# Patient Record
Sex: MALE | Race: WHITE | Employment: FULL TIME | URBAN - METROPOLITAN AREA
[De-identification: names, ages, dates, MRNs, and addresses within clinical notes are randomized per-mention and may not be internally consistent; named-entity substitution may affect disease eponyms.]

---

## 2017-01-18 ENCOUNTER — HOSPITAL ENCOUNTER (EMERGENCY)
Age: 52
Discharge: HOME | End: 2017-01-18
Attending: EMERGENCY MEDICINE
Payer: SELF-PAY

## 2017-01-18 VITALS
DIASTOLIC BLOOD PRESSURE: 97 MMHG | WEIGHT: 202.16 LBS | TEMPERATURE: 97.8 F | HEART RATE: 98 BPM | OXYGEN SATURATION: 97 % | BODY MASS INDEX: 26.79 KG/M2 | HEIGHT: 73 IN | SYSTOLIC BLOOD PRESSURE: 135 MMHG | RESPIRATION RATE: 18 BRPM

## 2017-01-18 DIAGNOSIS — Z72.0 TOBACCO ABUSE: ICD-10-CM

## 2017-01-18 DIAGNOSIS — S90.851A FOREIGN BODY IN FOOT, RIGHT, INITIAL ENCOUNTER: Primary | ICD-10-CM

## 2017-01-18 DIAGNOSIS — M25.474 SWELLING OF FOOT JOINT, RIGHT: ICD-10-CM

## 2017-01-18 PROCEDURE — 99282 EMERGENCY DEPT VISIT SF MDM: CPT

## 2017-01-18 RX ORDER — HYDROCODONE BITARTRATE AND ACETAMINOPHEN 5; 325 MG/1; MG/1
1 TABLET ORAL
Qty: 10 TAB | Refills: 0 | Status: SHIPPED | OUTPATIENT
Start: 2017-01-18 | End: 2017-01-21

## 2017-01-18 RX ORDER — CEPHALEXIN 500 MG/1
500 CAPSULE ORAL 4 TIMES DAILY
Qty: 28 CAP | Refills: 0 | Status: SHIPPED | OUTPATIENT
Start: 2017-01-18 | End: 2017-01-25

## 2017-01-18 NOTE — LETTER
[address] 
[facility] EMERGENCY DEPT 
[address] [place] [address] [address] [id number] 
[phone] Work/School Note Date: 1/18/2017 To Whom It May concern: 
 
Olayinka Thakkar was seen and treated today in the emergency room by the following provider(s): 
Attending Provider: Felicia Gonzalez MD 
Physician Assistant: LEANDER Nick. Please allow Olayinka Thakkar to be late to work today. He was in the ER this morning. Sincerely, Sandra Nick

## 2017-01-18 NOTE — ED NOTES
LEANDER Klein provided patient with verbal and written discharge instructions. Patient discharged ambulatory.

## 2017-01-18 NOTE — DISCHARGE INSTRUCTIONS
Object in the Skin: Care Instructions  Your Care Instructions  Small objects (splinters) of wood, metal, glass, or plastic can become embedded in the skin. Thorns from [employer] and other plants also can prick or become stuck in the skin. Splinters can cause an infection if they are not removed. Your doctor probably removed the object and cleaned the skin well. Your doctor may have given you antibiotics to prevent infection and a tetanus shot if you had not had one in the last 5 years or do not know when you had your last one. For a few days, you may have pain and itching in the wound where the object was removed. Follow-up care is a key part of your treatment and safety. Be sure to make and go to all appointments, and call your doctor if you are having problems. It's also a good idea to know your test results and keep a list of the medicines you take. How can you care for yourself at home? · If your doctor told you how to care for your wound, follow your doctor's instructions. If you did not get instructions, follow this general advice:  ¨ Wash the wound with clean water 2 times a day. Don't use hydrogen peroxide or alcohol, which can slow healing. ¨ You may cover the wound with a thin layer of petroleum jelly, such as Vaseline, and a nonstick bandage. ¨ Apply more petroleum jelly and replace the bandage as needed. · Your doctor may have used medicine to numb your skin. When it wears off, your pain may return. Take an over-the-counter pain medicine, such as acetaminophen (Tylenol), ibuprofen (Advil, Motrin), or naproxen (Aleve). Read and follow all instructions on the label. · Do not take two or more pain medicines at the same time unless the doctor told you to. Many pain medicines have acetaminophen, which is Tylenol. Too much acetaminophen (Tylenol) can be harmful. · If your doctor prescribed antibiotics, take them as directed. Do not stop taking them just because you feel better.  You need to take the full course of antibiotics. · After 2 or 3 days, if your swelling is gone, apply a heating pad set on low or a warm cloth to your wound area. Some doctors suggest that you go back and forth between hot and cold. Put a thin cloth between the heating pad and your skin. · It may help to prop up the affected part of your body on a pillow anytime you sit or lie down during the next 3 days. Try to keep it above the level of your heart. This will help reduce swelling. · Your wound may itch or feel irritated. A little redness and swelling is normal. Do not scratch or rub the wound. When should you call for help? Call your doctor now or seek immediate medical care if:  · The skin near the wound is cool or pale or changes color. · You have tingling, weakness, or numbness in the area near the wound. · The wound starts to bleed, and blood soaks the bandage. Oozing small amounts of blood is normal.  · You have trouble moving a limb near the wound. · You have signs of infection, such as:  ¨ Increased pain, swelling, warmth, or redness. ¨ Red streaks leading from the wound. ¨ Pus draining from the wound. ¨ A fever. Watch closely for changes in your health, and be sure to contact your doctor if:  · You do not get better as expected. Where can you learn more? Go to http://damián-dejuan.info/. Enter Santino Muñiz in the search box to learn more about \"Object in the Skin: Care Instructions. \"  Current as of: May 27, 2016  Content Version: 11.1  © 4173-8800 [employer]. Care instructions adapted under license by [employer] (which disclaims liability or warranty for this information). If you have questions about a medical condition or this instruction, always ask your healthcare professional. [address] any warranty or liability for your use of this information.

## 2017-01-18 NOTE — ED PROVIDER NOTES
The history is provided by the patient. No  was used. Betzy Abdi is a 49 y.o. male who presents ambulatory to [facility] ED c/o swelling and tenderness to ball right foot for few days with patient removing tiny shard of glass this morning. Weight bearing on ball of right foot for past few days \"felt like I was walking on tube of toothpaste\". Patient unsure when glass may have gotten into ball of foot. No drainage from site, calf pain, h/a, fever/chills, URI s/s, cough, n/v, SOB, CP, urinary s/s, or other specific c/o or concerns today. Tetanus UTD x 3 years ago. PCP: Christie Warren MD  Allergies: none    There are no other complaints, changes or physical findings at this time. Past Medical History:   Diagnosis Date    Hypertension        Past Surgical History:   Procedure Laterality Date    Hx orthopaedic       Left wrist    Hx orthopaedic       right knee    Hx orthopaedic       herniated disc    Hx appendectomy           No family history on file. Social History     Social History    Marital status: SINGLE     Spouse name: N/A    Number of children: N/A    Years of education: N/A     Occupational History    Not on file. Social History Main Topics    Smoking status: Current Every Day Smoker     Packs/day: 1.50    Smokeless tobacco: Not on file    Alcohol use 3.0 oz/week     6 Cans of beer per week    Drug use: No    Sexual activity: Not on file     Other Topics Concern    Not on file     Social History Narrative    No narrative on file         ALLERGIES: Review of patient's allergies indicates no known allergies. Review of Systems   Constitutional: Negative for chills and fever. HENT: Negative for ear pain, rhinorrhea and sore throat. Respiratory: Negative for cough and shortness of breath. Cardiovascular: Negative for chest pain. Gastrointestinal: Negative for nausea and vomiting. Genitourinary: Negative for dysuria.    Skin: Positive for wound. +Puncture wound ball right foot   Neurological: Negative for headaches. All other systems reviewed and are negative. There were no vitals filed for this visit. Physical Exam   Constitutional: He is oriented to person, place, and time. He appears well-developed and well-nourished. No distress. Pleasant 50 yo  male   HENT:   Head: Normocephalic and atraumatic. Eyes: Conjunctivae are normal. Right eye exhibits no discharge. Left eye exhibits no discharge. Neck: Normal range of motion. Neck supple. Cardiovascular: Normal rate and intact distal pulses. Pulmonary/Chest: Effort normal.   Musculoskeletal:   R FOOT: + tender puncture wound to the base of the 2nd metatarsal without drainage or surrounding erythema. Slight swelling. No ecchymosis. Distal NV intact. Cap refill < 2 sec. Ambulatory without limp. Neurological: He is alert and oriented to person, place, and time. Skin: Skin is warm and dry. He is not diaphoretic. Psychiatric: He has a normal mood and affect. His behavior is normal.   Nursing note and vitals reviewed. MDM  Number of Diagnoses or Management Options  Foreign body in foot, right, initial encounter:   Swelling of foot joint, right:   Tobacco abuse:   Diagnosis management comments: DDx: FB, abscess, cellulitis, puncture wound, tobacco abuse    Patient declines imaging today to evaluate for additional FB due to being \"self pay\". Pt has picture of small piece of glass that he pulled out of foot on his cell phone. ED Course       Procedures     TOBACCO COUNSELING:  Upon evaluation, pt expressed that they are a current tobacco user. Pt has been counseled on the dangers of smoking and was encouraged to quit as soon as possible in order to decrease further risks to their health. Pt has conveyed their understanding of the risks involved should they continue to use tobacco products.     7:20 AM  LEANDER Roman educated patient on the s/s or infection to watch for and advised patient to fill and use prescribed ABX as directed. DISCHARGE NOTE:  7:26 AM  The care plan has been outline with the patient and/or family, who verbally conveyed understanding and agreement. Available results have been reviewed. Patient and/or family understand the follow up plan as outlined and discharge instructions. Should their condition deterioration at any time after discharge the patient agrees to return, follow up sooner than outlined or seek medical assistance at the closest Emergency Room as soon as possible. Questions have been answered. Discharge instructions and educational information regarding the patient's diagnosis as well a list of reasons why the patient would want to seek immediate medical attention, should their condition change, were reviewed directly with the patient/family         CLINICAL IMPRESSION:  1. Foreign body in foot, right, initial encounter    2. Swelling of foot joint, right    3. Tobacco abuse        Plan:  1. Stop smoking  2. Narcotic precautions reviewed with patient prior to discharge  3. Fill ABX RX only if s/s of infection occur  4. Apply ice to area of discomfort  Current Discharge Medication List      START taking these medications    Details   cephALEXin (KEFLEX) 500 mg capsule Take 1 Cap by mouth four (4) times daily for 7 days. Qty: 28 Cap, Refills: 0      HYDROcodone-acetaminophen (NORCO) 5-325 mg per tablet Take 1 Tab by mouth every six (6) hours as needed for Pain for up to 3 days. Max Daily Amount: 4 Tabs. Qty: 10 Tab, Refills: 0         CONTINUE these medications which have NOT CHANGED    Details   lisinopril (PRINIVIL, ZESTRIL) 20 mg tablet Take 20 mg by mouth daily. ALPRAZOLAM (XANAX PO) Take  by mouth.            Follow-up Information     Follow up With Details Comments Contact Chidi Braun MD In 1 week As needed for wound check [address]  [place] [phone]  [phone]          Return to the closest emergency room or follow up sooner for any deterioration  Patient's condition is stable and patient is ready to go home. This note is prepared by Jacinta Litten, acting as Scribe for CHAUNCEY Casanova PA-C : The scribe's documentation has been prepared under my direction and personally reviewed by me in its entirety. I confirm that the note above accurately reflects all work, treatment, procedures, and medical decision making performed by me.

## 2019-03-08 ENCOUNTER — APPOINTMENT (OUTPATIENT)
Dept: ULTRASOUND IMAGING | Age: 54
End: 2019-03-08
Attending: EMERGENCY MEDICINE
Payer: COMMERCIAL

## 2019-03-08 ENCOUNTER — HOSPITAL ENCOUNTER (EMERGENCY)
Age: 54
Discharge: HOME | End: 2019-03-08
Attending: EMERGENCY MEDICINE
Payer: COMMERCIAL

## 2019-03-08 VITALS
OXYGEN SATURATION: 96 % | HEART RATE: 109 BPM | SYSTOLIC BLOOD PRESSURE: 134 MMHG | DIASTOLIC BLOOD PRESSURE: 73 MMHG | WEIGHT: 195.33 LBS | RESPIRATION RATE: 18 BRPM | BODY MASS INDEX: 25.89 KG/M2 | TEMPERATURE: 98.4 F | HEIGHT: 73 IN

## 2019-03-08 DIAGNOSIS — F41.1 ANXIETY STATE: ICD-10-CM

## 2019-03-08 DIAGNOSIS — R11.2 NON-INTRACTABLE VOMITING WITH NAUSEA, UNSPECIFIED VOMITING TYPE: Primary | ICD-10-CM

## 2019-03-08 DIAGNOSIS — R79.89 ELEVATED LFTS: ICD-10-CM

## 2019-03-08 LAB
ALBUMIN SERPL-MCNC: 3.1 G/DL (ref 3.5–5)
ALBUMIN/GLOB SERPL: 0.9 {RATIO} (ref 1.1–2.2)
ALP SERPL-CCNC: 262 U/L (ref 45–117)
ALT SERPL-CCNC: 226 U/L (ref 12–78)
AMPHET UR QL SCN: NEGATIVE
ANION GAP SERPL CALC-SCNC: 14 MMOL/L (ref 5–15)
APPEARANCE UR: CLEAR
AST SERPL-CCNC: 273 U/L (ref 15–37)
BACTERIA URNS QL MICRO: NEGATIVE /HPF
BARBITURATES UR QL SCN: NEGATIVE
BASOPHILS # BLD: 0 K/UL (ref 0–0.1)
BASOPHILS NFR BLD: 1 % (ref 0–1)
BENZODIAZ UR QL: NEGATIVE
BILIRUB SERPL-MCNC: 1.3 MG/DL (ref 0.2–1)
BILIRUB UR QL: NEGATIVE
BUN SERPL-MCNC: 13 MG/DL (ref 6–20)
BUN/CREAT SERPL: 11 (ref 12–20)
CALCIUM SERPL-MCNC: 8 MG/DL (ref 8.5–10.1)
CANNABINOIDS UR QL SCN: POSITIVE
CHLORIDE SERPL-SCNC: 88 MMOL/L (ref 97–108)
CK SERPL-CCNC: 206 U/L (ref 39–308)
CO2 SERPL-SCNC: 19 MMOL/L (ref 21–32)
COCAINE UR QL SCN: NEGATIVE
COLOR UR: NORMAL
COMMENT, HOLDF: NORMAL
CREAT SERPL-MCNC: 1.21 MG/DL (ref 0.7–1.3)
DIFFERENTIAL METHOD BLD: ABNORMAL
DRUG SCRN COMMENT,DRGCM: ABNORMAL
EOSINOPHIL # BLD: 0 K/UL (ref 0–0.4)
EOSINOPHIL NFR BLD: 0 % (ref 0–7)
EPITH CASTS URNS QL MICRO: NORMAL /LPF
ERYTHROCYTE [DISTWIDTH] IN BLOOD BY AUTOMATED COUNT: 11.9 % (ref 11.5–14.5)
GLOBULIN SER CALC-MCNC: 3.6 G/DL (ref 2–4)
GLUCOSE SERPL-MCNC: 165 MG/DL (ref 65–100)
GLUCOSE UR STRIP.AUTO-MCNC: NEGATIVE MG/DL
HCT VFR BLD AUTO: 40.3 % (ref 36.6–50.3)
HGB BLD-MCNC: 15.1 G/DL (ref 12.1–17)
HGB UR QL STRIP: NEGATIVE
HYALINE CASTS URNS QL MICRO: NORMAL /LPF (ref 0–5)
IMM GRANULOCYTES # BLD AUTO: 0 K/UL (ref 0–0.04)
IMM GRANULOCYTES NFR BLD AUTO: 0 % (ref 0–0.5)
KETONES UR QL STRIP.AUTO: NEGATIVE MG/DL
LEUKOCYTE ESTERASE UR QL STRIP.AUTO: NEGATIVE
LIPASE SERPL-CCNC: 146 U/L (ref 73–393)
LYMPHOCYTES # BLD: 1.7 K/UL (ref 0.8–3.5)
LYMPHOCYTES NFR BLD: 32 % (ref 12–49)
MCH RBC QN AUTO: 35.2 PG (ref 26–34)
MCHC RBC AUTO-ENTMCNC: 37.5 G/DL (ref 30–36.5)
MCV RBC AUTO: 93.9 FL (ref 80–99)
METHADONE UR QL: NEGATIVE
MONOCYTES # BLD: 0.3 K/UL (ref 0–1)
MONOCYTES NFR BLD: 6 % (ref 5–13)
NEUTS SEG # BLD: 3.3 K/UL (ref 1.8–8)
NEUTS SEG NFR BLD: 61 % (ref 32–75)
NITRITE UR QL STRIP.AUTO: NEGATIVE
NRBC # BLD: 0 K/UL (ref 0–0.01)
NRBC BLD-RTO: 0 PER 100 WBC
OPIATES UR QL: NEGATIVE
PCP UR QL: NEGATIVE
PH UR STRIP: 6 [PH] (ref 5–8)
PLATELET # BLD AUTO: 169 K/UL (ref 150–400)
PMV BLD AUTO: 10.8 FL (ref 8.9–12.9)
POTASSIUM SERPL-SCNC: 4.3 MMOL/L (ref 3.5–5.1)
PROT SERPL-MCNC: 6.7 G/DL (ref 6.4–8.2)
PROT UR STRIP-MCNC: NEGATIVE MG/DL
RBC # BLD AUTO: 4.29 M/UL (ref 4.1–5.7)
RBC #/AREA URNS HPF: NORMAL /HPF (ref 0–5)
SAMPLES BEING HELD,HOLD: NORMAL
SODIUM SERPL-SCNC: 121 MMOL/L (ref 136–145)
SP GR UR REFRACTOMETRY: 1.02 (ref 1–1.03)
TROPONIN I SERPL-MCNC: <0.05 NG/ML
TSH SERPL DL<=0.05 MIU/L-ACNC: 1.35 UIU/ML (ref 0.36–3.74)
UA: UC IF INDICATED,UAUC: NORMAL
UROBILINOGEN UR QL STRIP.AUTO: 1 EU/DL (ref 0.2–1)
WBC # BLD AUTO: 5.4 K/UL (ref 4.1–11.1)
WBC URNS QL MICRO: NORMAL /HPF (ref 0–4)

## 2019-03-08 PROCEDURE — 36415 COLL VENOUS BLD VENIPUNCTURE: CPT

## 2019-03-08 PROCEDURE — 82550 ASSAY OF CK (CPK): CPT

## 2019-03-08 PROCEDURE — 85025 COMPLETE CBC W/AUTO DIFF WBC: CPT

## 2019-03-08 PROCEDURE — 74011250636 HC RX REV CODE- 250/636: Performed by: EMERGENCY MEDICINE

## 2019-03-08 PROCEDURE — 96361 HYDRATE IV INFUSION ADD-ON: CPT

## 2019-03-08 PROCEDURE — 76705 ECHO EXAM OF ABDOMEN: CPT

## 2019-03-08 PROCEDURE — 99284 EMERGENCY DEPT VISIT MOD MDM: CPT

## 2019-03-08 PROCEDURE — 74011250637 HC RX REV CODE- 250/637: Performed by: EMERGENCY MEDICINE

## 2019-03-08 PROCEDURE — 84443 ASSAY THYROID STIM HORMONE: CPT

## 2019-03-08 PROCEDURE — 83690 ASSAY OF LIPASE: CPT

## 2019-03-08 PROCEDURE — 81001 URINALYSIS AUTO W/SCOPE: CPT

## 2019-03-08 PROCEDURE — 93005 ELECTROCARDIOGRAM TRACING: CPT

## 2019-03-08 PROCEDURE — 74011250636 HC RX REV CODE- 250/636: Performed by: PHYSICIAN ASSISTANT

## 2019-03-08 PROCEDURE — 96374 THER/PROPH/DIAG INJ IV PUSH: CPT

## 2019-03-08 PROCEDURE — 80053 COMPREHEN METABOLIC PANEL: CPT

## 2019-03-08 PROCEDURE — 84484 ASSAY OF TROPONIN QUANT: CPT

## 2019-03-08 PROCEDURE — 80307 DRUG TEST PRSMV CHEM ANLYZR: CPT

## 2019-03-08 PROCEDURE — 96376 TX/PRO/DX INJ SAME DRUG ADON: CPT

## 2019-03-08 PROCEDURE — 96375 TX/PRO/DX INJ NEW DRUG ADDON: CPT

## 2019-03-08 RX ORDER — ONDANSETRON 4 MG/1
4 TABLET, ORALLY DISINTEGRATING ORAL
Qty: 10 TAB | Refills: 0 | Status: SHIPPED | OUTPATIENT
Start: 2019-03-08 | End: 2019-04-10

## 2019-03-08 RX ORDER — TRAMADOL HYDROCHLORIDE 50 MG/1
50-100 TABLET ORAL 2 TIMES DAILY
COMMUNITY
End: 2019-04-10

## 2019-03-08 RX ORDER — NAPROXEN 500 MG/1
500 TABLET ORAL
COMMUNITY
End: 2019-04-10

## 2019-03-08 RX ORDER — ALPRAZOLAM 0.5 MG/1
0.5 TABLET ORAL
COMMUNITY
End: 2019-03-08

## 2019-03-08 RX ORDER — ONDANSETRON 2 MG/ML
4 INJECTION INTRAMUSCULAR; INTRAVENOUS
Status: COMPLETED | OUTPATIENT
Start: 2019-03-08 | End: 2019-03-08

## 2019-03-08 RX ORDER — ESCITALOPRAM OXALATE 20 MG/1
20 TABLET ORAL EVERY EVENING
COMMUNITY

## 2019-03-08 RX ORDER — LORAZEPAM 2 MG/ML
1 INJECTION INTRAMUSCULAR
Status: COMPLETED | OUTPATIENT
Start: 2019-03-08 | End: 2019-03-08

## 2019-03-08 RX ORDER — ALPRAZOLAM 0.5 MG/1
0.5 TABLET ORAL
Qty: 12 TAB | Refills: 0 | Status: SHIPPED | OUTPATIENT
Start: 2019-03-08 | End: 2019-04-08 | Stop reason: DRUGHIGH

## 2019-03-08 RX ORDER — LORAZEPAM 1 MG/1
1 TABLET ORAL
Status: COMPLETED | OUTPATIENT
Start: 2019-03-08 | End: 2019-03-08

## 2019-03-08 RX ADMIN — ONDANSETRON 4 MG: 2 INJECTION INTRAMUSCULAR; INTRAVENOUS at 16:04

## 2019-03-08 RX ADMIN — LORAZEPAM 1 MG: 1 TABLET ORAL at 21:04

## 2019-03-08 RX ADMIN — LORAZEPAM 1 MG: 2 INJECTION INTRAMUSCULAR; INTRAVENOUS at 16:16

## 2019-03-08 RX ADMIN — SODIUM CHLORIDE 1000 ML: 900 INJECTION, SOLUTION INTRAVENOUS at 17:24

## 2019-03-08 RX ADMIN — SODIUM CHLORIDE 1000 ML: 900 INJECTION, SOLUTION INTRAVENOUS at 21:05

## 2019-03-08 RX ADMIN — ONDANSETRON 4 MG: 2 INJECTION INTRAMUSCULAR; INTRAVENOUS at 20:57

## 2019-03-08 NOTE — ED NOTES
Patient presents to ED with complaints of vomiting, decreased appetite x6 days. States he has been taking his medication for anxiety, which he immediately throws up and he is now out of his medications. He states this has caused him increased anxiety. Patient reports some abdominal cramping, but no pain. He states movement also causes him to throw up. Patient denies hx of vertigo and reports recently cleaning his ears to make sure this was not the cause of his symptoms. Patient reports generalized weakness because he is unable to eat/drink. Wife at bedside. No further complaints noted. Monitor x2.

## 2019-03-08 NOTE — ED PROVIDER NOTES
EXPRESS CARE NOTE:  4:08 PM  I have seen and evaluated this patient in the Express Care portion of triage for intractable N/V x 6 days. Pt has noted hematemesis. The patients care will begin now and orders have been placed. This patient will be seen and provided further care in the Emergency Room. CHAUNCEY Gustafson    EMERGENCY DEPARTMENT HISTORY AND PHYSICAL EXAM      Date: 3/8/2019  Patient Name: Germania Coto    History of Presenting Illness     Chief Complaint   Patient presents with    Vomiting     Patient complain of vomiting for six days    Dizziness     Patient also complain of dizziness       History Provided By: Patient    HPI: Germania Coto, 47 y.o. male with PMHx significant for HTN, presents ambulatory to the ED with cc of persistent N/V and generalized abdominal cramping x 6 days, as well as intermittent lightheadedness x 2 days. Pt states that he has been unable to tolerate PO for 6 days. He also reports increased stress and anxiety, noting that his son has been having issues with law enforcement. Pt denies any sick contact or recent abx. He reports that he has been taking OTC antacids w/o any significant relief. Pt notes that he drinks ~ 2-3 beers nightly but denies a hx of withdrawal sx's. He specifically denies any fever, chills, SOB, CP, HA, diarrhea, or rash. Of note, he denied hematemesis    There are no other complaints, changes, or physical findings at this time. Social Hx: + EtOH (daily); + Smoker (1.5 ppd); - Illicit Drugs    PCP: Windy Saenz MD    Current Outpatient Medications   Medication Sig Dispense Refill    escitalopram oxalate (LEXAPRO PO) Take 10 mg by mouth.  tramadol HCl (TRAMADOL PO) Take  by mouth.  naproxen (NAPROSYN) 500 mg tablet Take 500 mg by mouth two (2) times daily (with meals).  ondansetron (ZOFRAN ODT) 4 mg disintegrating tablet Take 1 Tab by mouth every eight (8) hours as needed for Nausea.  10 Tab 0    ALPRAZolam Kris Welsh) 0.5 mg tablet Take 1 Tab by mouth every eight (8) hours as needed for Anxiety. Max Daily Amount: 1.5 mg. 12 Tab 0    lisinopril (PRINIVIL, ZESTRIL) 20 mg tablet Take 20 mg by mouth daily. Past History     Past Medical History:  Past Medical History:   Diagnosis Date    Hypertension        Past Surgical History:  Past Surgical History:   Procedure Laterality Date    HX APPENDECTOMY      HX ORTHOPAEDIC      Left wrist    HX ORTHOPAEDIC      right knee    HX ORTHOPAEDIC      herniated disc       Family History:  History reviewed. No pertinent family history. Social History:  Social History     Tobacco Use    Smoking status: Current Every Day Smoker     Packs/day: 1.50   Substance Use Topics    Alcohol use: Yes     Alcohol/week: 3.0 oz     Types: 6 Cans of beer per week    Drug use: No       Allergies:  No Known Allergies    Review of Systems   Review of Systems   Constitutional: Negative for chills and fever. HENT: Negative for congestion, rhinorrhea and sore throat. Respiratory: Negative for cough and shortness of breath. Cardiovascular: Negative for chest pain. Gastrointestinal: Positive for abdominal pain, nausea and vomiting. Negative for diarrhea. Genitourinary: Negative for dysuria and urgency. Skin: Negative for rash. Neurological: Negative for dizziness, light-headedness and headaches. All other systems reviewed and are negative. Physical Exam   Physical Exam   Constitutional: He is oriented to person, place, and time. He appears well-developed and well-nourished. No distress. HENT:   Head: Normocephalic and atraumatic. Eyes: Conjunctivae and EOM are normal. Pupils are equal, round, and reactive to light. Neck: Normal range of motion. Cardiovascular: Normal rate, regular rhythm and intact distal pulses. Pulmonary/Chest: Effort normal and breath sounds normal. No stridor. No respiratory distress. Abdominal: Soft. He exhibits no distension.  There is no tenderness. Musculoskeletal: Normal range of motion. Neurological: He is alert and oriented to person, place, and time. Skin: Skin is warm and dry. Psychiatric: He has a normal mood and affect. Nursing note and vitals reviewed. Diagnostic Study Results     Labs -     Recent Results (from the past 12 hour(s))   EKG, 12 LEAD, INITIAL    Collection Time: 03/08/19  1:54 PM   Result Value Ref Range    Ventricular Rate 102 BPM    Atrial Rate 102 BPM    P-R Interval 170 ms    QRS Duration 136 ms    Q-T Interval 388 ms    QTC Calculation (Bezet) 505 ms    Calculated P Axis 66 degrees    Calculated R Axis 73 degrees    Calculated T Axis -46 degrees    Diagnosis       Sinus tachycardia  Left bundle branch block  No previous ECGs available     URINALYSIS W/ REFLEX CULTURE    Collection Time: 03/08/19  2:32 PM   Result Value Ref Range    Color YELLOW/STRAW      Appearance CLEAR CLEAR      Specific gravity 1.018 1.003 - 1.030      pH (UA) 6.0 5.0 - 8.0      Protein NEGATIVE  NEG mg/dL    Glucose NEGATIVE  NEG mg/dL    Ketone NEGATIVE  NEG mg/dL    Bilirubin NEGATIVE  NEG      Blood NEGATIVE  NEG      Urobilinogen 1.0 0.2 - 1.0 EU/dL    Nitrites NEGATIVE  NEG      Leukocyte Esterase NEGATIVE  NEG      WBC 0-4 0 - 4 /hpf    RBC 0-5 0 - 5 /hpf    Epithelial cells FEW FEW /lpf    Bacteria NEGATIVE  NEG /hpf    UA:UC IF INDICATED CULTURE NOT INDICATED BY UA RESULT CNI      Hyaline cast 0-2 0 - 5 /lpf   DRUG SCREEN, URINE    Collection Time: 03/08/19  2:32 PM   Result Value Ref Range    AMPHETAMINES NEGATIVE  NEG      BARBITURATES NEGATIVE  NEG      BENZODIAZEPINES NEGATIVE  NEG      COCAINE NEGATIVE  NEG      METHADONE NEGATIVE  NEG      OPIATES NEGATIVE  NEG      PCP(PHENCYCLIDINE) NEGATIVE  NEG      THC (TH-CANNABINOL) POSITIVE (A) NEG      Drug screen comment (NOTE)    CBC WITH AUTOMATED DIFF    Collection Time: 03/08/19  2:46 PM   Result Value Ref Range    WBC 5.4 4.1 - 11.1 K/uL    RBC 4.29 4. 10 - 5.70 M/uL    HGB 15.1 12.1 - 17.0 g/dL    HCT 40.3 36.6 - 50.3 %    MCV 93.9 80.0 - 99.0 FL    MCH 35.2 (H) 26.0 - 34.0 PG    MCHC 37.5 (H) 30.0 - 36.5 g/dL    RDW 11.9 11.5 - 14.5 %    PLATELET 378 569 - 036 K/uL    MPV 10.8 8.9 - 12.9 FL    NRBC 0.0 0  WBC    ABSOLUTE NRBC 0.00 0.00 - 0.01 K/uL    NEUTROPHILS 61 32 - 75 %    LYMPHOCYTES 32 12 - 49 %    MONOCYTES 6 5 - 13 %    EOSINOPHILS 0 0 - 7 %    BASOPHILS 1 0 - 1 %    IMMATURE GRANULOCYTES 0 0.0 - 0.5 %    ABS. NEUTROPHILS 3.3 1.8 - 8.0 K/UL    ABS. LYMPHOCYTES 1.7 0.8 - 3.5 K/UL    ABS. MONOCYTES 0.3 0.0 - 1.0 K/UL    ABS. EOSINOPHILS 0.0 0.0 - 0.4 K/UL    ABS. BASOPHILS 0.0 0.0 - 0.1 K/UL    ABS. IMM. GRANS. 0.0 0.00 - 0.04 K/UL    DF AUTOMATED     METABOLIC PANEL, COMPREHENSIVE    Collection Time: 03/08/19  2:46 PM   Result Value Ref Range    Sodium 121 (L) 136 - 145 mmol/L    Potassium 4.3 3.5 - 5.1 mmol/L    Chloride 88 (L) 97 - 108 mmol/L    CO2 19 (L) 21 - 32 mmol/L    Anion gap 14 5 - 15 mmol/L    Glucose 165 (H) 65 - 100 mg/dL    BUN 13 6 - 20 MG/DL    Creatinine 1.21 0.70 - 1.30 MG/DL    BUN/Creatinine ratio 11 (L) 12 - 20      GFR est AA >60 >60 ml/min/1.73m2    GFR est non-AA >60 >60 ml/min/1.73m2    Calcium 8.0 (L) 8.5 - 10.1 MG/DL    Bilirubin, total 1.3 (H) 0.2 - 1.0 MG/DL    ALT (SGPT) 226 (H) 12 - 78 U/L    AST (SGOT) 273 (H) 15 - 37 U/L    Alk. phosphatase 262 (H) 45 - 117 U/L    Protein, total 6.7 6.4 - 8.2 g/dL    Albumin 3.1 (L) 3.5 - 5.0 g/dL    Globulin 3.6 2.0 - 4.0 g/dL    A-G Ratio 0.9 (L) 1.1 - 2.2     SAMPLES BEING HELD    Collection Time: 03/08/19  2:46 PM   Result Value Ref Range    SAMPLES BEING HELD [id number]     COMMENT        Add-on orders for these samples will be processed based on acceptable specimen integrity and analyte stability, which may vary by analyte.    TROPONIN I    Collection Time: 03/08/19  2:46 PM   Result Value Ref Range    Troponin-I, Qt. <0.05 <0.05 ng/mL   CK W/ REFLX CKMB    Collection Time: 03/08/19  2:46 PM Result Value Ref Range     39 - 308 U/L   LIPASE    Collection Time: 03/08/19  2:46 PM   Result Value Ref Range    Lipase 146 73 - 393 U/L   TSH 3RD GENERATION    Collection Time: 03/08/19  2:46 PM   Result Value Ref Range    TSH 1.35 0.36 - 3.74 uIU/mL       Radiologic Studies -   US ABD LTD   Final Result   IMPRESSION: Hepatic steatosis           CT Results  (Last 48 hours)    None        CXR Results  (Last 48 hours)    None          Medical Decision Making   I am the first provider for this patient. I reviewed the vital signs, available nursing notes, past medical history, past surgical history, family history and social history. Vital Signs-Reviewed the patient's vital signs. Patient Vitals for the past 12 hrs:   Temp Pulse Resp BP SpO2   03/08/19 2053 98.4 °F (36.9 °C) (!) 109 18 134/73 96 %   03/08/19 1721  84 16 135/87    03/08/19 1605  98 19 114/88 99 %   03/08/19 1355 98.4 °F (36.9 °C) (!) 113 18  99 %       Pulse Oximetry Analysis - 99% on RA    Cardiac Monitor:   Rate: 98 bpm  Rhythm: Normal Sinus Rhythm      EKG interpretation: (Preliminary)1530  Rhythm: sinus tachycardia; and regular . Rate (approx.): 102; Axis: normal; AK interval: normal; QRS interval: normal ; ST/T wave: normal; Other findings: LBBB. Written by Marco Antonio Vo, ED Scribe, as dictated by NATAN Mota MD    Records Reviewed: Nursing Notes, Old Medical Records, Previous Radiology Studies and Previous Laboratory Studies    Provider Notes (Medical Decision Making):   DDx: viral illness, UTI, gastritis, PUD, alcohol w/d    Patient presents with complaints of nausea, vomiting. On exam, the patient has no focal abdominal tenderness to suggest a surgical pathology. No RLQ or RUQ pain to suggest cholecystitis or appendicitis. Given benign abdominal exam, no CT imaging at this time. Will check CBC, CMP, lipase, UA. Will provide antiemetics, pain medication, IVF.  Re-eval pending lab work      ED Course:   Initial assessment performed. The patients presenting problems have been discussed, and they are in agreement with the care plan formulated and outlined with them. I have encouraged them to ask questions as they arise throughout their visit. 10:00 PM  Pt has been reassessed by NATAN Duvall MD. He reports that he is feeling much better. Discussed elevated LFT's with pt and need to follow-up with PCP. Pt does admit to drinking more prior to sx onset, may be cause for elevated LFTs. US was performed with no gallbladder pathology noted. Pt in agreement.  was also reviewed. Critical Care Time:   None    Disposition:  Discharge Note:  10:01 PM  The patient has been re-evaluated and is ready for discharge. Reviewed available results with patient. Counseled patient/parent/guardian on diagnosis and care plan. Patient has expressed understanding, and all questions have been answered. Patient agrees with plan and agrees to follow up as recommended, or return to the ED if their symptoms worsen. Discharge instructions have been provided and explained to the patient, along with reasons to return to the ED. PLAN:  1. Current Discharge Medication List      START taking these medications    Details   ondansetron (ZOFRAN ODT) 4 mg disintegrating tablet Take 1 Tab by mouth every eight (8) hours as needed for Nausea. Qty: 10 Tab, Refills: 0         CONTINUE these medications which have CHANGED    Details   ALPRAZolam (XANAX) 0.5 mg tablet Take 1 Tab by mouth every eight (8) hours as needed for Anxiety. Max Daily Amount: 1.5 mg.  Qty: 12 Tab, Refills: 0    Associated Diagnoses: Anxiety state           2.    Follow-up Information     Follow up With Specialties Details Why Wing Hernandez MD Pediatrics, Internal Medicine Schedule an appointment as soon as possible for a visit  [place] [address]  [address] [place] [address]  [phone]      [facility] EMERGENCY DEPT Emergency Medicine  As needed, If symptoms worsen [address] [place] [address]  [phone]        Return to ED if worse     Diagnosis     Clinical Impression:   1. Non-intractable vomiting with nausea, unspecified vomiting type    2. Elevated LFTs    3. Anxiety state        This note is prepared by Kassandra Gutierrez, acting as Scribe for Happy Inspector. MD NATAN Gupta MD: The scribe's documentation has been prepared under my direction and personally reviewed by me in its entirety. I confirm that the note above accurately reflects all work, treatment, procedures, and medical decision making performed by me. This note will not be viewable in [address]

## 2019-03-09 LAB
ATRIAL RATE: 102 BPM
CALCULATED P AXIS, ECG09: 66 DEGREES
CALCULATED R AXIS, ECG10: 73 DEGREES
CALCULATED T AXIS, ECG11: -46 DEGREES
DIAGNOSIS, 93000: NORMAL
P-R INTERVAL, ECG05: 170 MS
Q-T INTERVAL, ECG07: 388 MS
QRS DURATION, ECG06: 136 MS
QTC CALCULATION (BEZET), ECG08: 505 MS
VENTRICULAR RATE, ECG03: 102 BPM

## 2019-03-09 NOTE — DISCHARGE INSTRUCTIONS
Patient Education        Nausea and Vomiting: Care Instructions  Your Care Instructions    When you are nauseated, you may feel weak and sweaty and notice a lot of saliva in your mouth. Nausea often leads to vomiting. Most of the time you do not need to worry about nausea and vomiting, but they can be signs of other illnesses. Two common causes of nausea and vomiting are stomach flu and food poisoning. Nausea and vomiting from viral stomach flu will usually start to improve within 24 hours. Nausea and vomiting from food poisoning may last from 12 to 48 hours. The doctor has checked you carefully, but problems can develop later. If you notice any problems or new symptoms, get medical treatment right away. Follow-up care is a key part of your treatment and safety. Be sure to make and go to all appointments, and call your doctor if you are having problems. It's also a good idea to know your test results and keep a list of the medicines you take. How can you care for yourself at home? · To prevent dehydration, drink plenty of fluids, enough so that your urine is light yellow or clear like water. Choose water and other caffeine-free clear liquids until you feel better. If you have kidney, heart, or liver disease and have to limit fluids, talk with your doctor before you increase the amount of fluids you drink. · Rest in bed until you feel better. · When you are able to eat, try clear soups, mild foods, and liquids until all symptoms are gone for 12 to 48 hours. Other good choices include dry toast, crackers, cooked cereal, and gelatin dessert, such as Jell-O. When should you call for help? Call 911 anytime you think you may need emergency care. For example, call if:    · You passed out (lost consciousness).    Call your doctor now or seek immediate medical care if:    · You have symptoms of dehydration, such as:  ? Dry eyes and a dry mouth. ? Passing only a little dark urine. ?  Feeling thirstier than usual.   · You have new or worsening belly pain.     · You have a new or higher fever.     · You vomit blood or what looks like coffee grounds.    Watch closely for changes in your health, and be sure to contact your doctor if:    · You have ongoing nausea and vomiting.     · Your vomiting is getting worse.     · Your vomiting lasts longer than 2 days.     · You are not getting better as expected. Where can you learn more? Go to http://damián-dejuan.info/. Enter 25 [id number] in the search box to learn more about \"Nausea and Vomiting: Care Instructions. \"  Current as of: September 23, 2018  Content Version: 11.9  © 2324-1749 [employer], [employer]. Care instructions adapted under license by [employer] (which disclaims liability or warranty for this information). If you have questions about a medical condition or this instruction, always ask your healthcare professional. [address] any warranty or liability for your use of this information.

## 2019-03-09 NOTE — ED NOTES
Dr. Myron Cruz reviewed discharge instructions with the patient. The patient verbalized understanding. All questions and concerns were addressed. The patient declined a wheelchair and is discharged ambulatory in the care of family members with instructions and prescriptions in hand. Pt is alert and oriented x 4. Respirations are clear and unlabored.

## 2019-03-09 NOTE — ED NOTES
Bedside shift change report given to Taina Donnelly RN (oncoming nurse) by Amauri Chang RN (offgoing nurse). Report included the following information SBAR, Kardex, ED Summary, MAR, Recent Results and Med Rec Status.

## 2019-04-08 ENCOUNTER — HOSPITAL ENCOUNTER (EMERGENCY)
Dept: ULTRASOUND IMAGING | Age: 54
Discharge: HOME | DRG: 433 | End: 2019-04-08
Attending: EMERGENCY MEDICINE
Payer: COMMERCIAL

## 2019-04-08 ENCOUNTER — HOSPITAL ENCOUNTER (INPATIENT)
Age: 54
LOS: 2 days | Discharge: HOME | DRG: 433 | End: 2019-04-10
Attending: EMERGENCY MEDICINE | Admitting: HOSPITALIST
Payer: COMMERCIAL

## 2019-04-08 ENCOUNTER — APPOINTMENT (OUTPATIENT)
Dept: NUCLEAR MEDICINE | Age: 54
DRG: 433 | End: 2019-04-08
Attending: HOSPITALIST
Payer: COMMERCIAL

## 2019-04-08 ENCOUNTER — APPOINTMENT (OUTPATIENT)
Dept: CT IMAGING | Age: 54
DRG: 433 | End: 2019-04-08
Attending: EMERGENCY MEDICINE
Payer: COMMERCIAL

## 2019-04-08 DIAGNOSIS — E80.6 HYPERBILIRUBINEMIA: ICD-10-CM

## 2019-04-08 DIAGNOSIS — R11.2 INTRACTABLE VOMITING WITH NAUSEA, UNSPECIFIED VOMITING TYPE: ICD-10-CM

## 2019-04-08 DIAGNOSIS — R74.8 ELEVATED LIVER ENZYMES: Primary | ICD-10-CM

## 2019-04-08 DIAGNOSIS — K70.10 ACUTE ALCOHOLIC HEPATITIS: ICD-10-CM

## 2019-04-08 LAB
ABO + RH BLD: NORMAL
ALBUMIN SERPL-MCNC: 2.8 G/DL (ref 3.5–5)
ALBUMIN/GLOB SERPL: 0.9 {RATIO} (ref 1.1–2.2)
ALP SERPL-CCNC: 362 U/L (ref 45–117)
ALT SERPL-CCNC: 105 U/L (ref 12–78)
ANION GAP SERPL CALC-SCNC: 7 MMOL/L (ref 5–15)
APPEARANCE UR: CLEAR
AST SERPL-CCNC: 224 U/L (ref 15–37)
BACTERIA URNS QL MICRO: NEGATIVE /HPF
BILIRUB SERPL-MCNC: 3.7 MG/DL (ref 0.2–1)
BILIRUB UR QL CFM: POSITIVE
BLOOD GROUP ANTIBODIES SERPL: NORMAL
BUN SERPL-MCNC: 9 MG/DL (ref 6–20)
BUN/CREAT SERPL: 10 (ref 12–20)
CALCIUM SERPL-MCNC: 8.1 MG/DL (ref 8.5–10.1)
CHLORIDE SERPL-SCNC: 97 MMOL/L (ref 97–108)
CO2 SERPL-SCNC: 25 MMOL/L (ref 21–32)
COLOR UR: NORMAL
COMMENT, HOLDF: NORMAL
CREAT SERPL-MCNC: 0.87 MG/DL (ref 0.7–1.3)
EPITH CASTS URNS QL MICRO: NORMAL /LPF
ERYTHROCYTE [DISTWIDTH] IN BLOOD BY AUTOMATED COUNT: 14.4 % (ref 11.5–14.5)
ETHANOL SERPL-MCNC: 42 MG/DL
FERRITIN SERPL-MCNC: 2079 NG/ML (ref 26–388)
GLOBULIN SER CALC-MCNC: 3.2 G/DL (ref 2–4)
GLUCOSE SERPL-MCNC: 140 MG/DL (ref 65–100)
GLUCOSE UR STRIP.AUTO-MCNC: NEGATIVE MG/DL
HAV IGM SER QL: NONREACTIVE
HBV CORE IGM SER QL: NONREACTIVE
HBV SURFACE AG SER QL: 0.4 INDEX
HBV SURFACE AG SER QL: NEGATIVE
HCT VFR BLD AUTO: 28.5 % (ref 36.6–50.3)
HCT VFR BLD AUTO: 30.5 % (ref 36.6–50.3)
HCV AB SERPL QL IA: NONREACTIVE
HCV COMMENT,HCGAC: NORMAL
HEMOCCULT STL QL: NEGATIVE
HGB BLD-MCNC: 10.1 G/DL (ref 12.1–17)
HGB BLD-MCNC: 10.9 G/DL (ref 12.1–17)
HGB UR QL STRIP: NEGATIVE
HYALINE CASTS URNS QL MICRO: NORMAL /LPF (ref 0–5)
INR PPP: 1.1 (ref 0.9–1.1)
KETONES UR QL STRIP.AUTO: NEGATIVE MG/DL
LDH SERPL L TO P-CCNC: 216 U/L (ref 85–241)
LEUKOCYTE ESTERASE UR QL STRIP.AUTO: NEGATIVE
LIPASE SERPL-CCNC: 180 U/L (ref 73–393)
MAGNESIUM SERPL-MCNC: 1.6 MG/DL (ref 1.6–2.4)
MCH RBC QN AUTO: 34.4 PG (ref 26–34)
MCHC RBC AUTO-ENTMCNC: 35.7 G/DL (ref 30–36.5)
MCV RBC AUTO: 96.2 FL (ref 80–99)
NITRITE UR QL STRIP.AUTO: NEGATIVE
NRBC # BLD: 0 K/UL (ref 0–0.01)
NRBC BLD-RTO: 0 PER 100 WBC
PH UR STRIP: 6 [PH] (ref 5–8)
PLATELET # BLD AUTO: 97 K/UL (ref 150–400)
PMV BLD AUTO: 11.5 FL (ref 8.9–12.9)
POTASSIUM SERPL-SCNC: 4.1 MMOL/L (ref 3.5–5.1)
PROT SERPL-MCNC: 6 G/DL (ref 6.4–8.2)
PROT UR STRIP-MCNC: NEGATIVE MG/DL
PROTHROMBIN TIME: 11.4 SEC (ref 9–11.1)
RBC # BLD AUTO: 3.17 M/UL (ref 4.1–5.7)
RBC #/AREA URNS HPF: NORMAL /HPF (ref 0–5)
SAMPLES BEING HELD,HOLD: NORMAL
SODIUM SERPL-SCNC: 129 MMOL/L (ref 136–145)
SP GR UR REFRACTOMETRY: 1.01 (ref 1–1.03)
SP1: NORMAL
SP2: NORMAL
SP3: NORMAL
SPECIMEN EXP DATE BLD: NORMAL
TSH SERPL DL<=0.05 MIU/L-ACNC: 1.21 UIU/ML (ref 0.36–3.74)
UA: UC IF INDICATED,UAUC: NORMAL
UROBILINOGEN UR QL STRIP.AUTO: 0.2 EU/DL (ref 0.2–1)
WBC # BLD AUTO: 4.5 K/UL (ref 4.1–11.1)
WBC URNS QL MICRO: NORMAL /HPF (ref 0–4)

## 2019-04-08 PROCEDURE — 80307 DRUG TEST PRSMV CHEM ANLYZR: CPT

## 2019-04-08 PROCEDURE — 99285 EMERGENCY DEPT VISIT HI MDM: CPT

## 2019-04-08 PROCEDURE — 96361 HYDRATE IV INFUSION ADD-ON: CPT

## 2019-04-08 PROCEDURE — 96365 THER/PROPH/DIAG IV INF INIT: CPT

## 2019-04-08 PROCEDURE — 74177 CT ABD & PELVIS W/CONTRAST: CPT

## 2019-04-08 PROCEDURE — 86900 BLOOD TYPING SEROLOGIC ABO: CPT

## 2019-04-08 PROCEDURE — 83615 LACTATE (LD) (LDH) ENZYME: CPT

## 2019-04-08 PROCEDURE — 82272 OCCULT BLD FECES 1-3 TESTS: CPT

## 2019-04-08 PROCEDURE — 83516 IMMUNOASSAY NONANTIBODY: CPT

## 2019-04-08 PROCEDURE — 76705 ECHO EXAM OF ABDOMEN: CPT

## 2019-04-08 PROCEDURE — 81001 URINALYSIS AUTO W/SCOPE: CPT

## 2019-04-08 PROCEDURE — 93005 ELECTROCARDIOGRAM TRACING: CPT

## 2019-04-08 PROCEDURE — 85027 COMPLETE CBC AUTOMATED: CPT

## 2019-04-08 PROCEDURE — 86038 ANTINUCLEAR ANTIBODIES: CPT

## 2019-04-08 PROCEDURE — 82728 ASSAY OF FERRITIN: CPT

## 2019-04-08 PROCEDURE — 74011250636 HC RX REV CODE- 250/636: Performed by: EMERGENCY MEDICINE

## 2019-04-08 PROCEDURE — 74011250637 HC RX REV CODE- 250/637: Performed by: EMERGENCY MEDICINE

## 2019-04-08 PROCEDURE — 85018 HEMOGLOBIN: CPT

## 2019-04-08 PROCEDURE — 65660000000 HC RM CCU STEPDOWN

## 2019-04-08 PROCEDURE — 84443 ASSAY THYROID STIM HORMONE: CPT

## 2019-04-08 PROCEDURE — 96375 TX/PRO/DX INJ NEW DRUG ADDON: CPT

## 2019-04-08 PROCEDURE — 36415 COLL VENOUS BLD VENIPUNCTURE: CPT

## 2019-04-08 PROCEDURE — C9113 INJ PANTOPRAZOLE SODIUM, VIA: HCPCS | Performed by: EMERGENCY MEDICINE

## 2019-04-08 PROCEDURE — 74011250637 HC RX REV CODE- 250/637: Performed by: HOSPITALIST

## 2019-04-08 PROCEDURE — A9537 TC99M MEBROFENIN: HCPCS

## 2019-04-08 PROCEDURE — 80053 COMPREHEN METABOLIC PANEL: CPT

## 2019-04-08 PROCEDURE — 74011000258 HC RX REV CODE- 258: Performed by: EMERGENCY MEDICINE

## 2019-04-08 PROCEDURE — 74011250636 HC RX REV CODE- 250/636: Performed by: FAMILY MEDICINE

## 2019-04-08 PROCEDURE — 83735 ASSAY OF MAGNESIUM: CPT

## 2019-04-08 PROCEDURE — 74011250636 HC RX REV CODE- 250/636: Performed by: HOSPITALIST

## 2019-04-08 PROCEDURE — 74011636320 HC RX REV CODE- 636/320: Performed by: EMERGENCY MEDICINE

## 2019-04-08 PROCEDURE — 85610 PROTHROMBIN TIME: CPT

## 2019-04-08 PROCEDURE — 83010 ASSAY OF HAPTOGLOBIN QUANT: CPT

## 2019-04-08 PROCEDURE — 83690 ASSAY OF LIPASE: CPT

## 2019-04-08 PROCEDURE — 82390 ASSAY OF CERULOPLASMIN: CPT

## 2019-04-08 PROCEDURE — 80074 ACUTE HEPATITIS PANEL: CPT

## 2019-04-08 RX ORDER — LORAZEPAM 2 MG/ML
1 INJECTION INTRAMUSCULAR
Status: DISCONTINUED | OUTPATIENT
Start: 2019-04-08 | End: 2019-04-10 | Stop reason: HOSPADM

## 2019-04-08 RX ORDER — ALBUTEROL SULFATE 0.83 MG/ML
2.5 SOLUTION RESPIRATORY (INHALATION)
COMMUNITY

## 2019-04-08 RX ORDER — MAGNESIUM SULFATE HEPTAHYDRATE 40 MG/ML
2 INJECTION, SOLUTION INTRAVENOUS ONCE
Status: COMPLETED | OUTPATIENT
Start: 2019-04-08 | End: 2019-04-08

## 2019-04-08 RX ORDER — ALPRAZOLAM 0.5 MG/1
0.5 TABLET ORAL
COMMUNITY
End: 2019-04-10

## 2019-04-08 RX ORDER — LORAZEPAM 2 MG/ML
0.5 INJECTION INTRAMUSCULAR
Status: COMPLETED | OUTPATIENT
Start: 2019-04-08 | End: 2019-04-08

## 2019-04-08 RX ORDER — ACETAMINOPHEN 325 MG/1
650 TABLET ORAL
Status: DISCONTINUED | OUTPATIENT
Start: 2019-04-08 | End: 2019-04-10 | Stop reason: HOSPADM

## 2019-04-08 RX ORDER — LORAZEPAM 2 MG/ML
2 INJECTION INTRAMUSCULAR
Status: DISCONTINUED | OUTPATIENT
Start: 2019-04-08 | End: 2019-04-10 | Stop reason: HOSPADM

## 2019-04-08 RX ORDER — IBUPROFEN 200 MG
400-600 TABLET ORAL
COMMUNITY
End: 2019-04-10

## 2019-04-08 RX ORDER — IBUPROFEN 200 MG
1 TABLET ORAL EVERY 24 HOURS
Status: DISCONTINUED | OUTPATIENT
Start: 2019-04-08 | End: 2019-04-10 | Stop reason: HOSPADM

## 2019-04-08 RX ORDER — SODIUM CHLORIDE 0.9 % (FLUSH) 0.9 %
5-40 SYRINGE (ML) INJECTION EVERY 8 HOURS
Status: DISCONTINUED | OUTPATIENT
Start: 2019-04-08 | End: 2019-04-10 | Stop reason: HOSPADM

## 2019-04-08 RX ORDER — SODIUM CHLORIDE 0.9 % (FLUSH) 0.9 %
10 SYRINGE (ML) INJECTION
Status: COMPLETED | OUTPATIENT
Start: 2019-04-08 | End: 2019-04-08

## 2019-04-08 RX ORDER — FOLIC ACID 1 MG/1
1 TABLET ORAL
Status: COMPLETED | OUTPATIENT
Start: 2019-04-08 | End: 2019-04-08

## 2019-04-08 RX ORDER — TRAMADOL HYDROCHLORIDE 50 MG/1
50 TABLET ORAL
Status: DISCONTINUED | OUTPATIENT
Start: 2019-04-08 | End: 2019-04-10 | Stop reason: HOSPADM

## 2019-04-08 RX ORDER — LANOLIN ALCOHOL/MO/W.PET/CERES
100 CREAM (GRAM) TOPICAL DAILY
Status: DISCONTINUED | OUTPATIENT
Start: 2019-04-10 | End: 2019-04-09

## 2019-04-08 RX ORDER — ALBUTEROL SULFATE 90 UG/1
2 AEROSOL, METERED RESPIRATORY (INHALATION)
COMMUNITY

## 2019-04-08 RX ORDER — LORAZEPAM 1 MG/1
1 TABLET ORAL EVERY 6 HOURS
Status: DISCONTINUED | OUTPATIENT
Start: 2019-04-08 | End: 2019-04-10 | Stop reason: HOSPADM

## 2019-04-08 RX ORDER — ESCITALOPRAM OXALATE 10 MG/1
20 TABLET ORAL EVERY EVENING
Status: DISCONTINUED | OUTPATIENT
Start: 2019-04-08 | End: 2019-04-10 | Stop reason: HOSPADM

## 2019-04-08 RX ORDER — ONDANSETRON 2 MG/ML
4 INJECTION INTRAMUSCULAR; INTRAVENOUS
Status: COMPLETED | OUTPATIENT
Start: 2019-04-08 | End: 2019-04-08

## 2019-04-08 RX ORDER — LISINOPRIL 5 MG/1
10 TABLET ORAL DAILY
Status: DISCONTINUED | OUTPATIENT
Start: 2019-04-09 | End: 2019-04-10 | Stop reason: HOSPADM

## 2019-04-08 RX ORDER — SODIUM CHLORIDE 9 MG/ML
75 INJECTION, SOLUTION INTRAVENOUS CONTINUOUS
Status: DISCONTINUED | OUTPATIENT
Start: 2019-04-08 | End: 2019-04-10 | Stop reason: HOSPADM

## 2019-04-08 RX ORDER — ASCORBIC ACID 500 MG
1000-5000 TABLET ORAL
COMMUNITY

## 2019-04-08 RX ORDER — PROCHLORPERAZINE EDISYLATE 5 MG/ML
5 INJECTION INTRAMUSCULAR; INTRAVENOUS
Status: DISCONTINUED | OUTPATIENT
Start: 2019-04-08 | End: 2019-04-10 | Stop reason: HOSPADM

## 2019-04-08 RX ORDER — MORPHINE SULFATE 2 MG/ML
2 INJECTION, SOLUTION INTRAMUSCULAR; INTRAVENOUS ONCE
Status: COMPLETED | OUTPATIENT
Start: 2019-04-08 | End: 2019-04-08

## 2019-04-08 RX ORDER — SODIUM CHLORIDE 9 MG/ML
1000 INJECTION, SOLUTION INTRAVENOUS ONCE
Status: COMPLETED | OUTPATIENT
Start: 2019-04-08 | End: 2019-04-08

## 2019-04-08 RX ORDER — FOLIC ACID 1 MG/1
1 TABLET ORAL DAILY
Status: DISCONTINUED | OUTPATIENT
Start: 2019-04-10 | End: 2019-04-10 | Stop reason: HOSPADM

## 2019-04-08 RX ORDER — SODIUM CHLORIDE 0.9 % (FLUSH) 0.9 %
5-40 SYRINGE (ML) INJECTION AS NEEDED
Status: DISCONTINUED | OUTPATIENT
Start: 2019-04-08 | End: 2019-04-10 | Stop reason: HOSPADM

## 2019-04-08 RX ADMIN — DIATRIZOATE MEGLUMINE AND DIATRIZOATE SODIUM 30 ML: 660; 100 LIQUID ORAL; RECTAL at 11:08

## 2019-04-08 RX ADMIN — Medication 10 ML: at 12:27

## 2019-04-08 RX ADMIN — Medication 10 ML: at 18:09

## 2019-04-08 RX ADMIN — PANTOPRAZOLE SODIUM 40 MG: 40 INJECTION, POWDER, FOR SOLUTION INTRAVENOUS at 11:08

## 2019-04-08 RX ADMIN — LORAZEPAM 1 MG: 1 TABLET ORAL at 18:16

## 2019-04-08 RX ADMIN — MAGNESIUM SULFATE HEPTAHYDRATE 2 G: 40 INJECTION, SOLUTION INTRAVENOUS at 11:50

## 2019-04-08 RX ADMIN — ESCITALOPRAM OXALATE 20 MG: 10 TABLET ORAL at 20:15

## 2019-04-08 RX ADMIN — THIAMINE HYDROCHLORIDE 100 MG: 100 INJECTION, SOLUTION INTRAMUSCULAR; INTRAVENOUS at 11:43

## 2019-04-08 RX ADMIN — LORAZEPAM 0.5 MG: 2 INJECTION INTRAMUSCULAR; INTRAVENOUS at 16:31

## 2019-04-08 RX ADMIN — FOLIC ACID 1 MG: 1 TABLET ORAL at 11:08

## 2019-04-08 RX ADMIN — LORAZEPAM 0.5 MG: 2 INJECTION INTRAMUSCULAR; INTRAVENOUS at 14:32

## 2019-04-08 RX ADMIN — FAMOTIDINE 20 MG: 10 INJECTION, SOLUTION INTRAVENOUS at 11:08

## 2019-04-08 RX ADMIN — SODIUM CHLORIDE 1000 ML: 900 INJECTION, SOLUTION INTRAVENOUS at 11:08

## 2019-04-08 RX ADMIN — ONDANSETRON 4 MG: 2 INJECTION INTRAMUSCULAR; INTRAVENOUS at 10:49

## 2019-04-08 RX ADMIN — SODIUM CHLORIDE 75 ML/HR: 900 INJECTION, SOLUTION INTRAVENOUS at 18:09

## 2019-04-08 RX ADMIN — SODIUM CHLORIDE 1000 ML: 900 INJECTION, SOLUTION INTRAVENOUS at 10:49

## 2019-04-08 RX ADMIN — IOPAMIDOL 100 ML: 755 INJECTION, SOLUTION INTRAVENOUS at 12:27

## 2019-04-08 RX ADMIN — MORPHINE SULFATE 2 MG: 2 INJECTION, SOLUTION INTRAMUSCULAR; INTRAVENOUS at 23:04

## 2019-04-08 NOTE — ED NOTES
Patient in room. Call bell within reach/ Side rails up x2/ Bed in low/locked position/ Oriented to unit. Changed in gown. Blankets provided/Repositioned for Position of Comfort. Explained Plan of care with patient. Patient instructed to call when getting OOB. Will continue to monitor. Placed on continuous 02 monitoring and cycling BP.

## 2019-04-08 NOTE — ED PROVIDER NOTES
EMERGENCY DEPARTMENT HISTORY AND PHYSICAL EXAM 
 
 
Date: 4/8/2019 Patient Name: Olayinka Thakkar History of Presenting Illness Chief Complaint Patient presents with  Jaundice History Provided By: Patient and Patient's Wife HPI: Olayinka Thakkar, 47 y.o. male with PMHx significant for daily alcohol use and hepatic steatosis, presents from PCP office to the ED with cc of persistent vomiting over the last several weeks. Patient reports being evaluated in the ED previously and diagnosed with fatty liver. He reports intermittent, but persistent nausea and vomiting over the past 6 weeks. He now has slight jaundice in his eyes. He does have an upcoming appointment on April 18 with Dr. El Reynolds. He was sent from his PCP office this morning over concern for persistent symptoms and now icteric sclera. He does report minimal diarrhea. He complains of some nonspecific right-sided and upper quadrant abdominal pain. He is having difficulty tolerating p.o. intake solids or liquids. Urine is dark, however there is no dysuria. Denies any objective fever. He does frequently take NSAIDs for muscular skeletal elements. He has never had an endoscopy before. There are no other complaints, changes, or physical findings at this time. PCP: Duane Rocha MD 
 
No current facility-administered medications on file prior to encounter. Current Outpatient Medications on File Prior to Encounter Medication Sig Dispense Refill  escitalopram oxalate (LEXAPRO PO) Take 10 mg by mouth.  tramadol HCl (TRAMADOL PO) Take  by mouth.  naproxen (NAPROSYN) 500 mg tablet Take 500 mg by mouth two (2) times daily (with meals).  ondansetron (ZOFRAN ODT) 4 mg disintegrating tablet Take 1 Tab by mouth every eight (8) hours as needed for Nausea. 10 Tab 0  ALPRAZolam (XANAX) 0.5 mg tablet Take 1 Tab by mouth every eight (8) hours as needed for Anxiety. Max Daily Amount: 1.5 mg. 12 Tab 0  
 lisinopril (PRINIVIL, ZESTRIL) 20 mg tablet Take 20 mg by mouth daily. Past History Past Medical History: 
Past Medical History:  
Diagnosis Date  Hypertension Past Surgical History: 
Past Surgical History:  
Procedure Laterality Date  HX APPENDECTOMY  HX ORTHOPAEDIC Left wrist  
 HX ORTHOPAEDIC    
 right knee  HX ORTHOPAEDIC    
 herniated disc Family History: No family history on file. Social History: 
Social History Tobacco Use  Smoking status: Current Every Day Smoker Packs/day: 1.50 Substance Use Topics  Alcohol use: Yes Alcohol/week: 3.0 oz Types: 6 Cans of beer per week  Drug use: No  
 
 
Allergies: 
No Known Allergies Review of Systems Review of Systems Constitutional: Negative for chills and fever. HENT: Negative for congestion. Eyes: Negative for visual disturbance. Respiratory: Negative for chest tightness. Cardiovascular: Negative for chest pain and leg swelling. Gastrointestinal: Positive for abdominal pain, diarrhea (minimal), nausea and vomiting. Endocrine: Negative for polyuria. Genitourinary: Negative for dysuria and frequency. Musculoskeletal: Negative for myalgias. Skin: Negative for color change. Allergic/Immunologic: Negative for immunocompromised state. Neurological: Negative for numbness. Physical Exam  
Physical Exam  
Nursing note and vitals reviewed. General appearance: non-toxic, minimal distress Eyes: PERRL, EOMI, conjunctiva normal, mildly icteric sclera HEENT: mucous membranes tacky, oropharynx is clear Pulmonary: clear to auscultation bilaterally Cardiac: normal rate and regular rhythm, no murmurs, gallops, or rubs, 2+DP pulses, 2+ radial pulses, no Osler's nodes, no Janeway lesions Abdomen: soft,  tender to palpation right upper quadrant and epigastrium, palpable hepatomegaly, no rebound, no guarding, nondistended, bowel sounds present MSK: no pre-tibial edema Neuro: Alert, answers questions appropriately Skin: capillary refill brisk, no petechial rash, mild hyperemia/spider veins noted over the upper chest wall Diagnostic Study Results Labs - Recent Results (from the past 12 hour(s)) SAMPLES BEING HELD Collection Time: 04/08/19  9:51 AM  
Result Value Ref Range SAMPLES BEING HELD 1BLUE 1RED 1SST 1PST 1LAV   
 COMMENT Add-on orders for these samples will be processed based on acceptable specimen integrity and analyte stability, which may vary by analyte. CBC W/O DIFF Collection Time: 04/08/19  9:51 AM  
Result Value Ref Range WBC 4.5 4.1 - 11.1 K/uL  
 RBC 3.17 (L) 4.10 - 5.70 M/uL  
 HGB 10.9 (L) 12.1 - 17.0 g/dL HCT 30.5 (L) 36.6 - 50.3 % MCV 96.2 80.0 - 99.0 FL  
 MCH 34.4 (H) 26.0 - 34.0 PG  
 MCHC 35.7 30.0 - 36.5 g/dL  
 RDW 14.4 11.5 - 14.5 % PLATELET 97 (L) 550 - 400 K/uL MPV 11.5 8.9 - 12.9 FL  
 NRBC 0.0 0  WBC ABSOLUTE NRBC 0.00 0.00 - 0.01 K/uL METABOLIC PANEL, COMPREHENSIVE Collection Time: 04/08/19  9:51 AM  
Result Value Ref Range Sodium 129 (L) 136 - 145 mmol/L Potassium 4.1 3.5 - 5.1 mmol/L Chloride 97 97 - 108 mmol/L  
 CO2 25 21 - 32 mmol/L Anion gap 7 5 - 15 mmol/L Glucose 140 (H) 65 - 100 mg/dL BUN 9 6 - 20 MG/DL Creatinine 0.87 0.70 - 1.30 MG/DL  
 BUN/Creatinine ratio 10 (L) 12 - 20 GFR est AA >60 >60 ml/min/1.73m2 GFR est non-AA >60 >60 ml/min/1.73m2 Calcium 8.1 (L) 8.5 - 10.1 MG/DL Bilirubin, total 3.7 (H) 0.2 - 1.0 MG/DL  
 ALT (SGPT) 105 (H) 12 - 78 U/L  
 AST (SGOT) 224 (H) 15 - 37 U/L Alk. phosphatase 362 (H) 45 - 117 U/L Protein, total 6.0 (L) 6.4 - 8.2 g/dL Albumin 2.8 (L) 3.5 - 5.0 g/dL Globulin 3.2 2.0 - 4.0 g/dL A-G Ratio 0.9 (L) 1.1 - 2.2 LIPASE Collection Time: 04/08/19  9:51 AM  
Result Value Ref Range Lipase 180 73 - 393 U/L MAGNESIUM Collection Time: 04/08/19  9:51 AM  
Result Value Ref Range Magnesium 1.6 1.6 - 2.4 mg/dL URINALYSIS W/ REFLEX CULTURE Collection Time: 04/08/19 10:14 AM  
Result Value Ref Range Color DARK YELLOW Appearance CLEAR CLEAR Specific gravity 1.010 1.003 - 1.030    
 pH (UA) 6.0 5.0 - 8.0 Protein NEGATIVE  NEG mg/dL Glucose NEGATIVE  NEG mg/dL Ketone NEGATIVE  NEG mg/dL Blood NEGATIVE  NEG Urobilinogen 0.2 0.2 - 1.0 EU/dL Nitrites NEGATIVE  NEG Leukocyte Esterase NEGATIVE  NEG    
 WBC 0-4 0 - 4 /hpf  
 RBC 0-5 0 - 5 /hpf Epithelial cells FEW FEW /lpf Bacteria NEGATIVE  NEG /hpf  
 UA:UC IF INDICATED CULTURE NOT INDICATED BY UA RESULT CNI Hyaline cast 0-2 0 - 5 /lpf  
BILIRUBIN, CONFIRM Collection Time: 04/08/19 10:14 AM  
Result Value Ref Range Bilirubin UA, confirm POSITIVE (A) NEG Radiologic Studies -  
CT ABD PELV W CONT    (Results Pending) CT Results  (Last 48 hours) 04/08/19 1228  CT ABD PELV W CONT Final result Impression:  IMPRESSION:  
1. Small volume pericholecystic fluid may be reactive from adjacent  
steatohepatitis, but correlation for signs/symptoms of cholecystitis is  
recommended. 2.  Hepatomegaly with marked hepatic steatosis. Narrative:  EXAM: CT ABD PELV W CONT INDICATION: Abdominal pain COMPARISON: Limited abdominal ultrasound 3/8/2019 CONTRAST: 100 mL of Isovue-[id number]. TECHNIQUE:   
Following the uneventful intravenous administration of contrast, thin axial  
images were obtained through the abdomen and pelvis. Coronal and sagittal  
reconstructions were generated. Oral contrast was administered. CT dose  
reduction was achieved through use of a standardized protocol tailored for this  
examination and automatic exposure control for dose modulation. FINDINGS:   
LUNG BASES: Clear. INCIDENTALLY IMAGED HEART AND MEDIASTINUM: Unremarkable. LIVER: Hepatomegaly with marked hepatic steatosis. GALLBLADDER: Small volume pericholecystic fluid. Small amount of sludge in the  
fundus. SPLEEN: No mass. PANCREAS: No mass or ductal dilatation. ADRENALS: Unremarkable. KIDNEYS: No mass, calculus, or hydronephrosis. STOMACH: Small diverticulum of the gastric fundus containing intraluminal  
contrast. Otherwise unremarkable. SMALL BOWEL: No dilatation or wall thickening. COLON: No dilatation or wall thickening. APPENDIX: Surgically absent. PERITONEUM: No ascites or pneumoperitoneum. RETROPERITONEUM: No lymphadenopathy or aortic aneurysm. Mild to moderate  
atherosclerotic disease of the aorta. REPRODUCTIVE ORGANS: Unremarkable. URINARY BLADDER: No mass or calculus. BONES: No destructive bone lesion. ADDITIONAL COMMENTS: N/A  
   
  
  
 
CXR Results  (Last 48 hours) None US ABD LTD: 
FINDINGS: The liver is enlarged with increased echogenicity. No evidence of a 
focal liver lesion. The portal vein is patent with flow towards the liver. The 
diameter of the portal vein measures 1.4 cm. The velocity measures 15.1 cm/sec. 
  
There is mild thickening of the gallbladder wall. There are no gallstones. There 
is no pericholecystic fluid. No sonographic Pinto sign. There is no intra or 
extrahepatic biliary ductal dilatation. The common bile duct measures 5 mm. 
  
The visualized pancreas and right kidney are within normal limits. The spleen is 
enlarged measuring 14.6 x 5.2 x 12.9 cm for a volume of 508 mL. 
  
IMPRESSION IMPRESSION: 
1. Hepatomegaly and hepatic steatosis. 2. Mild hepatomegaly 3. Mild gallbladder wall thickening. This can be seen in chronic liver disease. No gallstones or tenderness. Medical Decision Making I am the first provider for this patient.  
 
I reviewed the vital signs, available nursing notes, past medical history, past surgical history, family history and social history. Vital Signs-Reviewed the patient's vital signs. Patient Vitals for the past 12 hrs: 
 Temp Pulse Resp BP SpO2  
04/08/19 0941 98.1 °F (36.7 °C) 89 18 128/78 100 % Pulse Oximetry Analysis -100 % on room air Cardiac Monitor:  
Rate: 89 bpm 
Rhythm: Normal Sinus Rhythm EKG interpretation: (Preliminary) 11:17 AM 
Sinus rhythm with sinus arrhythmia, normal axis, rate 81, , QRS 96, QTc 497, no ST elevation or ST depression. Records Reviewed: Nursing Notes, Old Medical Records and Previous Radiology Studies Provider Notes (Medical Decision Making):  
60-year-old male with daily alcohol use presents with persistent nausea and vomiting, also noting frequent NSAID use. Previously evaluated with an ultrasound which showed hepatic steatosis. He appears somewhat more jaundiced today. Concern for ongoing gastritis, possible biliary colic, NSAID induced gastric injury. Will check labs, IV hydration, antiemetics, H2 blocker and PPI, and consult with GI. Patient seen by Dr. Gee Maier, orders placed. Rectal examination performed without obvious blood or melena. Essentially no suspicion for cholecystitis at this point time. Patient's case discussed with hospitalist for admission. ED Course:  
Initial assessment performed. The patients presenting problems have been discussed, and they are in agreement with the care plan formulated and outlined with them. I have encouraged them to ask questions as they arise throughout their visit. ED Course as of Apr 09 1659 Mon Apr 08, 2019  
1726 Rectal exam performed with Essence Eckert RN as chaperone. Minimal stool collected. No obvious melena or blood. External exam normal.  
 [FH] ED Course User Index [FH] Alonso Jackson MD  
 
 
Critical Care Time:  
0 Disposition: 
 
Admission Note: 
Patient is being admitted to the hospital by the hospitalist.  The results of their tests and reasons for their admission have been discussed with them and available family. They convey agreement and understanding for the need to be admitted and for their admission diagnosis. PLAN: 
1. Admission to the hospitalist 
 
 
Diagnosis Clinical Impression: 1. Elevated liver enzymes 2. Hyperbilirubinemia 3. Intractable vomiting with nausea, unspecified vomiting type Attestations:

## 2019-04-08 NOTE — H&P
Hospitalist Admission NoteNAME: Wilner Villatoro :  1965 MRN:  [id number] Date/Time:  2019  5:27 PM 
 
Patient PCP: Alysia Moura MD  
GI:  Dr. Howie Jasso (has not yet seen) 
______________________________________________________________________ Assessment & Plan: 
Vomiting since January, worse in past month Weight loss 15-20 lbs since January by his report Elevated LFTs and bilirubin. Could be from alcoholic hepatitis, rule out cholecystitis Mild RUQ pain with new thickened gallbladder wall on US and CT today compared to US last month Acute anemia hgb 10.9 today, was 15 last month. Concerning for GI bleeding, possible PUD given chronic NSAID use Chronic alcohol abuse, high risk for withdrawal 
Acute thrombocytopenia platelet 97 with splenomegaly, ?from cirrhosis or if primary hematology process such as ITP Tobacco abuse Marijuana abuse intermittently  
 
--monitor in stepdown due to concern for withdrawal.  Last drink yesterday, having mild hand tremors b/l on exam.  Put on ativan 1mg PO q6h. Additional IV ativan prn per SHAMAR. Goody bag tomorrow and then oral thiamine and folic acid. IV thiamine given in ER 
--stool exam to be done by ER physician. Empiric IV protonix for now. Recheck H&H now. Transfuse if hgb <7.   
--clear liquid diet for now. HIDA scan and if abnormal, consult general surgery. --monitor LFTs. GI consult 
--monitor platelets. Avoid heparin/lovenox for now. 
--continue lexapro 
--nicotine patch in house --discussed with patient he needs to quit alcohol Body mass index is 25.45 kg/m². Code:  full DVT prophylaxis:  SCD Surrogate decision maker:  wife Subjective: CHIEF COMPLAINT:  Vomiting, weight loss, jaundice HISTORY OF PRESENT ILLNESS:    
Wilner Villatoro is a 47 y.o.  male with HTN, arthritis, daily alcohol drinker sent from PCP's office to ER for jaundice.   Patient reports having intermittent vomiting since January. Has became worse in last month, vomiting several times all day, no association with eating or drinking. Has lost abou 15-20 lbs since January. In past week having intermittent abdominal cramping in b/l mid abdomen. Also having subjective fever and chills. Denies nightsweats. Stools alternating between hard to liquid and ranging colors from yellow to green to black. Last Friday had feculent appearing vomit and called pcp to advised him to go to ER but he chose not to go and made appointment today. He was noted to be jaundice and sent to ER. Two weeks ago he saw small amount of blood clots in vomit. Was seen in ER 3/8/19 due to feeling weak and vomiting and discharged home with zofran. Zofran did help with vomiting for while but has ran out. LFTs were elevated and abd US showed hepatic steatosis We were asked to admit for work up and evaluation of the above problems. Past Medical History:  
Diagnosis Date  Hypertension Past Surgical History:  
Procedure Laterality Date  HX APPENDECTOMY  HX ORTHOPAEDIC Left wrist  
 HX ORTHOPAEDIC    
 right knee  HX ORTHOPAEDIC    
 herniated disc Social History Tobacco Use  Smoking status: Current Every Day Smoker Packs/day: 1.50 Substance Use Topics  Alcohol use: Yes Alcohol/week: 3.0 oz Types: 6 Cans of beer per week Drug use:  Smokes marijuana intermittently from 2-4x/month to once every 3-4 months Work as . Got  in January Family History Adopted: Yes No Known Allergies Prior to Admission medications Medication Sig Start Date End Date Taking? Authorizing Provider ALPRAZolam (XANAX) 0.5 mg tablet Take 0.5 mg by mouth two (2) times daily as needed for Anxiety. Yes Provider, Historical  
ibuprofen (MOTRIN) 200 mg tablet Take 400-600 mg by mouth every six (6) hours as needed for Pain.    Yes Provider, Historical  
 Pseudoephedrine-Ibuprofen (ADVIL COLD AND SINUS)  mg cap Take 1 Cap by mouth two (2) times daily as needed (cold symptoms). Yes Provider, Historical  
albuterol (PROVENTIL VENTOLIN) 2.5 mg /3 mL (0.083 %) nebulizer solution 2.5 mg by Nebulization route two (2) times daily as needed for Wheezing or Shortness of Breath. Yes Provider, Historical  
albuterol (PROVENTIL HFA, VENTOLIN HFA, PROAIR HFA) 90 mcg/actuation inhaler Take 2 Puffs by inhalation every four (4) hours as needed for Wheezing or Shortness of Breath. Yes Provider, Historical  
ascorbic acid, vitamin C, (VITAMIN C) 500 mg tablet Take 1,000-5,000 mg by mouth daily as needed (cold). Yes Provider, Historical  
mv-mn/folic [id number] (DAVID MULTIVITAMIN FOR MEN PO) Take 1 Tab by mouth daily. Yes Provider, Historical  
escitalopram oxalate (LEXAPRO) 20 mg tablet Take 20 mg by mouth every evening. Yes Other, MD Yessi  
traMADol (ULTRAM) 50 mg tablet Take  mg by mouth two (2) times a day. Yes Harshad, MD Yessi  
ondansetron (ZOFRAN ODT) 4 mg disintegrating tablet Take 1 Tab by mouth every eight (8) hours as needed for Nausea. 3/8/19  Yes Benito Jolly MD  
lisinopril (PRINIVIL, ZESTRIL) 20 mg tablet Take 10 mg by mouth two (2) times a day. Yes Harshad, MD Yessi  
naproxen (NAPROSYN) 500 mg tablet Take 500 mg by mouth two (2) times daily as needed for Pain. Other, MD Yessi  
 
REVIEW OF SYSTEMS:  POSITIVE= Bold. Negative = normal text General:  fever, chills, sweats, generalized weakness, weight loss/gain, loss of appetite Eyes:  blurred vision, eye pain, loss of vision, diplopia Ear Nose and Throat:  rhinorrhea, pharyngitis Respiratory:   cough, sputum production, SOB, wheezing, TALBERT, pleuritic pain 
Cardiology:  chest pain, palpitations, orthopnea, PND, edema, syncope Gastrointestinal:  abdominal pain, N/V, dysphagia, diarrhea, constipation, bleeding Genitourinary:  frequency, urgency, dysuria, hematuria, incontinence Muskuloskeletal :  arthralgia, myalgia Hematology:  easy bruising, bleeding, lymphadenopathy Dermatological:  rash, ulceration, pruritis Endocrine:  hot flashes or polydipsia Neurological:  headache, dizziness, confusion, focal weakness, paresthesia, memory loss, gait disturbance Psychological: anxiety, depression, agitation Objective: VITALS:   
Visit Vitals /84 Pulse 76 Temp 98.8 °F (37.1 °C) Resp 16 Ht 6' 1\" (1.854 m) Wt 87.5 kg (192 lb 14.4 oz) SpO2 99% BMI 25.45 kg/m² Temp (24hrs), Av.8 °F (37.1 °C), Min:98.1 °F (36.7 °C), Max:99.5 °F (37.5 °C) Body mass index is 25.45 kg/m². PHYSICAL EXAM: 
 
General:    Alert, cooperative, no distress, appears stated age. HEENT: Atraumatic, +icterus sclerae, pink conjunctivae No oral ulcers, mucosa moist, throat clear. Hearing intact. Neck:  Supple, symmetrical,  thyroid: non tender Lungs:   Clear to auscultation bilaterally. No Wheezing or Rhonchi. No rales. Chest wall:  No tenderness  No Accessory muscle use. Heart:   Regular  rhythm,  No  murmur   No gallop. No edema. Abdomen:   Soft, mild RUQ and epigastric tenderness. +liver edge palpable. Not distended. Bowel sounds normal. No masses Extremities: No cyanosis. No clubbing Skin:     Not pale + Jaundiced  Spider angiomas on chest  
Psych:  Good insight. Not depressed. Not anxious or agitated. Neurologic: EOMs intact. No facial asymmetry. No aphasia or slurred speech. Symmetrical strength, Alert and oriented X 3. Mild b/l hand tremor Peripheral pulse: Right, DP, 2+ Capillary refill:  normal 
 
IMAGING RESULTS: 
 []       I have personally reviewed the actual   []     CXR  []     CT scan CXR: 
CT : 
EKG: 
 ________________________________________________________________________ Care Plan discussed with: 
  Comments Patient y Family RN Care Manager Consultant:     
________________________________________________________________________ Prophylaxis: 
GI PPI  
DVT SCD  
________________________________________________________________________ Recommended Disposition:  
Home with Family y HH/PT/OT/RN   
SNF/LTC   
DALIA   
________________________________________________________________________ Code Status: 
Full Code y  
DNR/DNI   
________________________________________________________________________ TOTAL TIME:  65 minutes Comments  
 y Reviewed previous records  
 
 
______________________________________________________________________ Lavern Chan MD 
 
 
Procedures: see electronic medical records for all procedures/Xrays and details which were not copied into this note but were reviewed prior to creation of Plan. LAB DATA REVIEWED:   
Recent Results (from the past 24 hour(s)) SAMPLES BEING HELD Collection Time: 04/08/19  9:51 AM  
Result Value Ref Range SAMPLES BEING HELD 1BLUE 1RED 1SST 1PST 1LAV   
 COMMENT Add-on orders for these samples will be processed based on acceptable specimen integrity and analyte stability, which may vary by analyte. CBC W/O DIFF Collection Time: 04/08/19  9:51 AM  
Result Value Ref Range WBC 4.5 4.1 - 11.1 K/uL  
 RBC 3.17 (L) 4.10 - 5.70 M/uL  
 HGB 10.9 (L) 12.1 - 17.0 g/dL HCT 30.5 (L) 36.6 - 50.3 % MCV 96.2 80.0 - 99.0 FL  
 MCH 34.4 (H) 26.0 - 34.0 PG  
 MCHC 35.7 30.0 - 36.5 g/dL  
 RDW 14.4 11.5 - 14.5 % PLATELET 97 (L) 546 - 400 K/uL MPV 11.5 8.9 - 12.9 FL  
 NRBC 0.0 0  WBC ABSOLUTE NRBC 0.00 0.00 - 0.01 K/uL METABOLIC PANEL, COMPREHENSIVE Collection Time: 04/08/19  9:51 AM  
Result Value Ref Range Sodium 129 (L) 136 - 145 mmol/L Potassium 4.1 3.5 - 5.1 mmol/L Chloride 97 97 - 108 mmol/L  
 CO2 25 21 - 32 mmol/L Anion gap 7 5 - 15 mmol/L Glucose 140 (H) 65 - 100 mg/dL  BUN 9 6 - 20 MG/DL  
 Creatinine 0.87 0.70 - 1.30 MG/DL  
 BUN/Creatinine ratio 10 (L) 12 - 20 GFR est AA >60 >60 ml/min/1.73m2 GFR est non-AA >60 >60 ml/min/1.73m2 Calcium 8.1 (L) 8.5 - 10.1 MG/DL Bilirubin, total 3.7 (H) 0.2 - 1.0 MG/DL  
 ALT (SGPT) 105 (H) 12 - 78 U/L  
 AST (SGOT) 224 (H) 15 - 37 U/L Alk. phosphatase 362 (H) 45 - 117 U/L Protein, total 6.0 (L) 6.4 - 8.2 g/dL Albumin 2.8 (L) 3.5 - 5.0 g/dL Globulin 3.2 2.0 - 4.0 g/dL A-G Ratio 0.9 (L) 1.1 - 2.2 LIPASE Collection Time: 04/08/19  9:51 AM  
Result Value Ref Range Lipase 180 73 - 393 U/L MAGNESIUM Collection Time: 04/08/19  9:51 AM  
Result Value Ref Range Magnesium 1.6 1.6 - 2.4 mg/dL PROTHROMBIN TIME + INR Collection Time: 04/08/19  9:51 AM  
Result Value Ref Range INR 1.1 0.9 - 1.1 Prothrombin time 11.4 (H) 9.0 - 11.1 sec URINALYSIS W/ REFLEX CULTURE Collection Time: 04/08/19 10:14 AM  
Result Value Ref Range Color DARK YELLOW Appearance CLEAR CLEAR Specific gravity 1.010 1.003 - 1.030    
 pH (UA) 6.0 5.0 - 8.0 Protein NEGATIVE  NEG mg/dL Glucose NEGATIVE  NEG mg/dL Ketone NEGATIVE  NEG mg/dL Blood NEGATIVE  NEG Urobilinogen 0.2 0.2 - 1.0 EU/dL Nitrites NEGATIVE  NEG Leukocyte Esterase NEGATIVE  NEG    
 WBC 0-4 0 - 4 /hpf  
 RBC 0-5 0 - 5 /hpf Epithelial cells FEW FEW /lpf Bacteria NEGATIVE  NEG /hpf  
 UA:UC IF INDICATED CULTURE NOT INDICATED BY UA RESULT CNI Hyaline cast 0-2 0 - 5 /lpf  
BILIRUBIN, CONFIRM Collection Time: 04/08/19 10:14 AM  
Result Value Ref Range Bilirubin UA, confirm POSITIVE (A) NEG    
TSH 3RD GENERATION Collection Time: 04/08/19  3:46 PM  
Result Value Ref Range TSH 1.21 0.36 - 3.74 uIU/mL OCCULT BLOOD, STOOL Collection Time: 04/08/19  5:16 PM  
Result Value Ref Range  Occult blood, stool NEGATIVE  NEG

## 2019-04-08 NOTE — PROGRESS NOTES
Pharmacy Clarification of Prior to Admission Medication Regimen The patient was interviewed regarding clarification of the prior to admission medication regimen. The patient's wife, Chauncey Quintana, was present in room and obtained permission from patient to discuss drug regimen with visitor(s) present. The patient was questioned regarding use of any other inhalers, topical products, over the counter medications, herbal medications, vitamin products or ophthalmic/nasal/otic medication use. Information Obtained From: Rx Query, Patient, Patients Wife Pertinent Pharmacy Findings: 
Patient is noncompliant in taking his medication Patient self administers OTC medications for on coming \"Cold Symptoms\" ALPRAZolam (XANAX) 0.5 mg tablet: Patient stated that he is out of this medication and has been out :for about a week\" ondansetron (ZOFRAN ODT) 4 mg disintegrating tablet: Patient stated that he is out of this medication 
naproxen (NAPROSYN) 500 mg tablet: Patient has not had this PRN agent as of 19 PTA medication list was corrected to the following:  
 
Prior to Admission Medications Prescriptions Last Dose Informant Patient Reported? Taking? ALPRAZolam (XANAX) 0.5 mg tablet 2019 at Unknown time Self Yes Yes Sig: Take 0.5 mg by mouth two (2) times daily as needed for Anxiety. Pseudoephedrine-Ibuprofen (ADVIL COLD AND SINUS)  mg cap 2019 at Unknown time Self Yes Yes Sig: Take 1 Cap by mouth two (2) times daily as needed (cold symptoms). albuterol (PROVENTIL HFA, VENTOLIN HFA, PROAIR HFA) 90 mcg/actuation inhaler 3/15/2019 at Unknown time Self Yes Yes Sig: Take 2 Puffs by inhalation every four (4) hours as needed for Wheezing or Shortness of Breath. albuterol (PROVENTIL VENTOLIN) 2.5 mg /3 mL (0.083 %) nebulizer solution 3/15/2019 at Unknown time Self Yes Yes Si.5 mg by Nebulization route two (2) times daily as needed for Wheezing or Shortness of Breath. ascorbic acid, vitamin C, (VITAMIN C) 500 mg tablet 4/1/2019 at Unknown time Self Yes Yes Sig: Take 1,000-5,000 mg by mouth daily as needed (cold). escitalopram oxalate (LEXAPRO) 20 mg tablet 4/7/2019 at Unknown time Self Yes Yes Sig: Take 20 mg by mouth every evening. ibuprofen (MOTRIN) 200 mg tablet 4/8/2019 at Unknown time Self Yes Yes Sig: Take 400-600 mg by mouth every six (6) hours as needed for Pain. lisinopril (PRINIVIL, ZESTRIL) 20 mg tablet 4/8/2019 at Unknown time Self Yes Yes Sig: Take 10 mg by mouth two (2) times a day. mv-mn/folic [id number] (DAVID MULTIVITAMIN FOR MEN PO) 4/8/2019 at Unknown time Self Yes Yes Sig: Take 1 Tab by mouth daily. naproxen (NAPROSYN) 500 mg tablet Not Taking at Unknown time Self Yes No  
Sig: Take 500 mg by mouth two (2) times daily as needed for Pain. ondansetron (ZOFRAN ODT) 4 mg disintegrating tablet 4/7/2019 at Unknown time Self No Yes Sig: Take 1 Tab by mouth every eight (8) hours as needed for Nausea. traMADol (ULTRAM) 50 mg tablet 4/1/2019 at Unknown time Self Yes Yes Sig: Take  mg by mouth two (2) times a day. Facility-Administered Medications: None Thank you, Verlyn Mortimer Patriciaann Du) Grey Durham, CPhT Medication History Pharmacy Technician

## 2019-04-08 NOTE — ROUTINE PROCESS
Called ED for report, Report from [address] lab work to be drawn and sent.  
\ 
 
6:00 PM 
 sent stat lab work  from 130pm

## 2019-04-08 NOTE — ED NOTES
Pt walked to bathroom. Requested something for nausea. Case discussed with Dr Padmini Arreguin. Pt has QT interval at 500; will hold zofran and given ativan dose.

## 2019-04-08 NOTE — CONSULTS
Gastroenterology Consult Note  NAME: Hallie De Leon : 1965 MRN: [id number]   ATTG: [unfilled] PCP: Corey Gregory MD  Date/Time:  2019 3:20 PM  Subjective:   REASON FOR CONSULT:      Que Reaves is a 47 y.o.  male who I was asked to see for elevated LFTs, nausea and vomtiing. He saw his PCP and was to see us as OP but comes in with for hx of daily alcohol use and hepatic steatosis, presents from PCP office to the ED with cc of persistent vomiting over the last several weeks. Patient reports being evaluated in the ED x 1 month ago and diagnosed with 'fatty liver'. He reports worsening nausea and vomiting over the past 6 weeks. He now has jaundice. In the ER with his wofe. Has been an alcohol user 'every since I (he) was a teenager'. CT was done and is pending. LFTs, CMP, CBC, Coags reviewed. US reviewed and it shows   1. Hepatomegaly and hepatic steatosis. 2. Mild hepatomegaly  3. Mild gallbladder wall thickening. This can be seen in chronic liver disease. No gallstones or tenderness. Tox screen + for THC(acceepts usage). LFTs are as follows   Ref. Range 2019 09:51   Bilirubin, total Latest Ref Range: 0.2 - 1.0 MG/DL 3.7 (H)   Protein, total Latest Ref Range: 6.4 - 8.2 g/dL 6.0 (L)   Albumin Latest Ref Range: 3.5 - 5.0 g/dL 2.8 (L)   Globulin Latest Ref Range: 2.0 - 4.0 g/dL 3.2   A-G Ratio Latest Ref Range: 1.1 - 2.2   0.9 (L)   ALT (SGPT) Latest Ref Range: 12 - 78 U/L 105 (H)   AST Latest Ref Range: 15 - 37 U/L 224 (H)   Alk.  phosphatase Latest Ref Range: 45 - 117 U/L 362 (H)     Past Medical History:   Diagnosis Date    Hypertension       Past Surgical History:   Procedure Laterality Date    HX APPENDECTOMY      HX ORTHOPAEDIC      Left wrist    HX ORTHOPAEDIC      right knee    HX ORTHOPAEDIC      herniated disc     Social History     Tobacco Use    Smoking status: Current Every Day Smoker     Packs/day: 1.50   Substance Use Topics    Alcohol use: Yes     Alcohol/week: 3.0 oz     Types: 6 Cans of beer per week      No family history on file. No Known Allergies   Home Medications:  Prior to Admission Medications   Prescriptions Last Dose Informant Patient Reported? Taking? ALPRAZolam (XANAX) 0.5 mg tablet   No No   Sig: Take 1 Tab by mouth every eight (8) hours as needed for Anxiety. Max Daily Amount: 1.5 mg.   escitalopram oxalate (LEXAPRO PO)   Yes No   Sig: Take 10 mg by mouth.   lisinopril (PRINIVIL, ZESTRIL) 20 mg tablet   Yes No   Sig: Take 20 mg by mouth daily. naproxen (NAPROSYN) 500 mg tablet   Yes No   Sig: Take 500 mg by mouth two (2) times daily (with meals). ondansetron (ZOFRAN ODT) 4 mg disintegrating tablet   No No   Sig: Take 1 Tab by mouth every eight (8) hours as needed for Nausea. tramadol HCl (TRAMADOL PO)   Yes No   Sig: Take  by mouth. Facility-Administered Medications: None     Hospital medications:  No current facility-administered medications for this encounter. Current Outpatient Medications   Medication Sig    escitalopram oxalate (LEXAPRO PO) Take 10 mg by mouth.  tramadol HCl (TRAMADOL PO) Take  by mouth.  naproxen (NAPROSYN) 500 mg tablet Take 500 mg by mouth two (2) times daily (with meals).  ondansetron (ZOFRAN ODT) 4 mg disintegrating tablet Take 1 Tab by mouth every eight (8) hours as needed for Nausea.  ALPRAZolam (XANAX) 0.5 mg tablet Take 1 Tab by mouth every eight (8) hours as needed for Anxiety. Max Daily Amount: 1.5 mg.    lisinopril (PRINIVIL, ZESTRIL) 20 mg tablet Take 20 mg by mouth daily.      REVIEW OF SYSTEMS:     []     Unable to obtain  ROS due to  []    mental status change  []    sedated   []    intubated   [x]    Total of 11 systems reviewed as follows:  Const:  negative fever, negative chills, negative weight loss  Eyes:   negative diplopia or visual changes, negative eye pain  ENT:   negative coryza, negative sore throat  Resp:   negative cough, hemoptysis, dyspnea  Cards:  negative for chest pain, palpitations, lower extremity edema  :  negative for frequency, dysuria and hematuria  Skin:   negative for rash and pruritus  Heme:  negative for easy bruising and gum/nose bleeding  MS:  negative for myalgias, arthralgias, back pain and muscle weakness  Neurolo:  negative for headaches, dizziness, vertigo, memory problems   Psych:  negative for feelings of anxiety, depression     Pertinent Positives include :    Objective:   VITALS:    Visit Vitals  /85   Pulse 88   Temp 98.1 °F (36.7 °C)   Resp 16   Ht 6' 1\" (1.854 m)   Wt 87.5 kg (192 lb 14.4 oz)   SpO2 96%   BMI 25.45 kg/m²     Temp (24hrs), Av.1 °F (36.7 °C), Min:98.1 °F (36.7 °C), Max:98.1 °F (36.7 °C)    PHYSICAL EXAM:     General: No distress. ++ spiders on chest  Eyes: No icterus; extraocular movements intact,   ENMT: Lips, oropharynx unremarkable. Chest:  breath sounds are normal   Heart: Heart sounds normal, S1,S2  Abdomen: RUQ pain; bowel sounds present. Lymphatic: No supraclavicular lymphadenopathy   Neurologic: Alert and oriented   Psyc: Affect is appropriate   Extremities: No edema. + tremors. LAB DATA REVIEWED:    Recent Results (from the past 48 hour(s))   SAMPLES BEING HELD    Collection Time: 19  9:51 AM   Result Value Ref Range    SAMPLES BEING HELD 1BLUE 1RED 1SST 1PST 1LAV     COMMENT        Add-on orders for these samples will be processed based on acceptable specimen integrity and analyte stability, which may vary by analyte.    CBC W/O DIFF    Collection Time: 19  9:51 AM   Result Value Ref Range    WBC 4.5 4.1 - 11.1 K/uL    RBC 3.17 (L) 4.10 - 5.70 M/uL    HGB 10.9 (L) 12.1 - 17.0 g/dL    HCT 30.5 (L) 36.6 - 50.3 %    MCV 96.2 80.0 - 99.0 FL    MCH 34.4 (H) 26.0 - 34.0 PG    MCHC 35.7 30.0 - 36.5 g/dL    RDW 14.4 11.5 - 14.5 %    PLATELET 97 (L) 684 - 400 K/uL    MPV 11.5 8.9 - 12.9 FL    NRBC 0.0 0  WBC    ABSOLUTE NRBC 0.00 0.00 - 0.01 K/uL METABOLIC PANEL, COMPREHENSIVE    Collection Time: 04/08/19  9:51 AM   Result Value Ref Range    Sodium 129 (L) 136 - 145 mmol/L    Potassium 4.1 3.5 - 5.1 mmol/L    Chloride 97 97 - 108 mmol/L    CO2 25 21 - 32 mmol/L    Anion gap 7 5 - 15 mmol/L    Glucose 140 (H) 65 - 100 mg/dL    BUN 9 6 - 20 MG/DL    Creatinine 0.87 0.70 - 1.30 MG/DL    BUN/Creatinine ratio 10 (L) 12 - 20      GFR est AA >60 >60 ml/min/1.73m2    GFR est non-AA >60 >60 ml/min/1.73m2    Calcium 8.1 (L) 8.5 - 10.1 MG/DL    Bilirubin, total 3.7 (H) 0.2 - 1.0 MG/DL    ALT (SGPT) 105 (H) 12 - 78 U/L    AST (SGOT) 224 (H) 15 - 37 U/L    Alk.  phosphatase 362 (H) 45 - 117 U/L    Protein, total 6.0 (L) 6.4 - 8.2 g/dL    Albumin 2.8 (L) 3.5 - 5.0 g/dL    Globulin 3.2 2.0 - 4.0 g/dL    A-G Ratio 0.9 (L) 1.1 - 2.2     LIPASE    Collection Time: 04/08/19  9:51 AM   Result Value Ref Range    Lipase 180 73 - 393 U/L   MAGNESIUM    Collection Time: 04/08/19  9:51 AM   Result Value Ref Range    Magnesium 1.6 1.6 - 2.4 mg/dL   PROTHROMBIN TIME + INR    Collection Time: 04/08/19  9:51 AM   Result Value Ref Range    INR 1.1 0.9 - 1.1      Prothrombin time 11.4 (H) 9.0 - 11.1 sec   URINALYSIS W/ REFLEX CULTURE    Collection Time: 04/08/19 10:14 AM   Result Value Ref Range    Color DARK YELLOW      Appearance CLEAR CLEAR      Specific gravity 1.010 1.003 - 1.030      pH (UA) 6.0 5.0 - 8.0      Protein NEGATIVE  NEG mg/dL    Glucose NEGATIVE  NEG mg/dL    Ketone NEGATIVE  NEG mg/dL    Blood NEGATIVE  NEG      Urobilinogen 0.2 0.2 - 1.0 EU/dL    Nitrites NEGATIVE  NEG      Leukocyte Esterase NEGATIVE  NEG      WBC 0-4 0 - 4 /hpf    RBC 0-5 0 - 5 /hpf    Epithelial cells FEW FEW /lpf    Bacteria NEGATIVE  NEG /hpf    UA:UC IF INDICATED CULTURE NOT INDICATED BY UA RESULT CNI      Hyaline cast 0-2 0 - 5 /lpf   BILIRUBIN, CONFIRM    Collection Time: 04/08/19 10:14 AM   Result Value Ref Range    Bilirubin UA, confirm POSITIVE (A) NEG       IMAGING RESULTS:   []      I have personally reviewed the actual   []    CXR  []    CT  []     US    Recommendations/Plan:     Elevated LFTs  Hx of alcoholism  Nausea and vomiting   Tremulous  Spider angiomas    ___________________________________________________  RECOMMENDATIONS:      · I spoke with wife and pt in detail. · His T bili is up. This could be from ETOH hepatitis but we await CT(US with GB wall thickening noted). · Needs admission to hospital.  · Will send serologies to r.o other causes of hepatopathy. · CIWA protocol. · Will follow with you. · DF not high enough to warrant prednisone. · IV PPI and anti emetics. · IVF  · EGD if not better. · Surgical input if CT also shows abnl GB    Thank you for entrusting me with this patient's care. Please do not hesitate to contact me with any questions or if I can be of assistance with this patient or any of your other patients' GI needs. Discussed Code Status:    [x]    Full Code      []    DNR    ___________________________________________________  Care Plan discussed with:    [x]    Patient   [x]    Family   []    Nursing   [x]    Attending     ___________________________________________________  GI: Doroteo Lisa Lemus MD

## 2019-04-09 ENCOUNTER — APPOINTMENT (OUTPATIENT)
Dept: MRI IMAGING | Age: 54
DRG: 433 | End: 2019-04-09
Attending: INTERNAL MEDICINE
Payer: COMMERCIAL

## 2019-04-09 LAB
ALBUMIN SERPL-MCNC: 2.2 G/DL (ref 3.5–5)
ALBUMIN/GLOB SERPL: 0.8 {RATIO} (ref 1.1–2.2)
ALP SERPL-CCNC: 297 U/L (ref 45–117)
ALT SERPL-CCNC: 79 U/L (ref 12–78)
ANION GAP SERPL CALC-SCNC: 6 MMOL/L (ref 5–15)
AST SERPL-CCNC: 189 U/L (ref 15–37)
ATRIAL RATE: 81 BPM
BILIRUB SERPL-MCNC: 3.4 MG/DL (ref 0.2–1)
BUN SERPL-MCNC: 5 MG/DL (ref 6–20)
BUN/CREAT SERPL: 8 (ref 12–20)
CALCIUM SERPL-MCNC: 7.2 MG/DL (ref 8.5–10.1)
CALCULATED P AXIS, ECG09: 24 DEGREES
CALCULATED R AXIS, ECG10: 4 DEGREES
CALCULATED T AXIS, ECG11: 9 DEGREES
CHLORIDE SERPL-SCNC: 104 MMOL/L (ref 97–108)
CO2 SERPL-SCNC: 23 MMOL/L (ref 21–32)
CREAT SERPL-MCNC: 0.65 MG/DL (ref 0.7–1.3)
DIAGNOSIS, 93000: NORMAL
ERYTHROCYTE [DISTWIDTH] IN BLOOD BY AUTOMATED COUNT: 14.5 % (ref 11.5–14.5)
GLOBULIN SER CALC-MCNC: 2.8 G/DL (ref 2–4)
GLUCOSE SERPL-MCNC: 84 MG/DL (ref 65–100)
HAPTOGLOB SERPL-MCNC: 82 MG/DL (ref 30–200)
HCT VFR BLD AUTO: 25.6 % (ref 36.6–50.3)
HGB BLD-MCNC: 9 G/DL (ref 12.1–17)
IRON SATN MFR SERPL: ABNORMAL % (ref 20–50)
IRON SERPL-MCNC: 127 UG/DL (ref 35–150)
MCH RBC QN AUTO: 35.2 PG (ref 26–34)
MCHC RBC AUTO-ENTMCNC: 35.2 G/DL (ref 30–36.5)
MCV RBC AUTO: 100 FL (ref 80–99)
NRBC # BLD: 0 K/UL (ref 0–0.01)
NRBC BLD-RTO: 0 PER 100 WBC
P-R INTERVAL, ECG05: 152 MS
PLATELET # BLD AUTO: 78 K/UL (ref 150–400)
PMV BLD AUTO: 10.8 FL (ref 8.9–12.9)
POTASSIUM SERPL-SCNC: 3.7 MMOL/L (ref 3.5–5.1)
PROT SERPL-MCNC: 5 G/DL (ref 6.4–8.2)
Q-T INTERVAL, ECG07: 428 MS
QRS DURATION, ECG06: 96 MS
QTC CALCULATION (BEZET), ECG08: 497 MS
RBC # BLD AUTO: 2.56 M/UL (ref 4.1–5.7)
SODIUM SERPL-SCNC: 133 MMOL/L (ref 136–145)
TIBC SERPL-MCNC: 122 UG/DL (ref 250–450)
VENTRICULAR RATE, ECG03: 81 BPM
WBC # BLD AUTO: 3.3 K/UL (ref 4.1–11.1)

## 2019-04-09 PROCEDURE — 81256 HFE GENE: CPT

## 2019-04-09 PROCEDURE — 74011250637 HC RX REV CODE- 250/637: Performed by: HOSPITALIST

## 2019-04-09 PROCEDURE — 36415 COLL VENOUS BLD VENIPUNCTURE: CPT

## 2019-04-09 PROCEDURE — C9113 INJ PANTOPRAZOLE SODIUM, VIA: HCPCS | Performed by: HOSPITALIST

## 2019-04-09 PROCEDURE — 74011250636 HC RX REV CODE- 250/636: Performed by: HOSPITALIST

## 2019-04-09 PROCEDURE — 74183 MRI ABD W/O CNTR FLWD CNTR: CPT

## 2019-04-09 PROCEDURE — 83540 ASSAY OF IRON: CPT

## 2019-04-09 PROCEDURE — 80053 COMPREHEN METABOLIC PANEL: CPT

## 2019-04-09 PROCEDURE — 74011250636 HC RX REV CODE- 250/636: Performed by: INTERNAL MEDICINE

## 2019-04-09 PROCEDURE — A9585 GADOBUTROL INJECTION: HCPCS | Performed by: INTERNAL MEDICINE

## 2019-04-09 PROCEDURE — 85027 COMPLETE CBC AUTOMATED: CPT

## 2019-04-09 PROCEDURE — 74011000250 HC RX REV CODE- 250: Performed by: HOSPITALIST

## 2019-04-09 PROCEDURE — 65270000015 HC RM PRIVATE ONCOLOGY

## 2019-04-09 RX ORDER — ASPIRIN 325 MG/1
100 TABLET, FILM COATED ORAL DAILY
Status: DISCONTINUED | OUTPATIENT
Start: 2019-04-10 | End: 2019-04-10 | Stop reason: HOSPADM

## 2019-04-09 RX ADMIN — SODIUM CHLORIDE 40 MG: 9 INJECTION, SOLUTION INTRAMUSCULAR; INTRAVENOUS; SUBCUTANEOUS at 08:53

## 2019-04-09 RX ADMIN — LORAZEPAM 1 MG: 1 TABLET ORAL at 00:24

## 2019-04-09 RX ADMIN — PROCHLORPERAZINE EDISYLATE 5 MG: 5 INJECTION INTRAMUSCULAR; INTRAVENOUS at 14:54

## 2019-04-09 RX ADMIN — Medication 10 ML: at 14:55

## 2019-04-09 RX ADMIN — PROCHLORPERAZINE EDISYLATE 5 MG: 5 INJECTION INTRAMUSCULAR; INTRAVENOUS at 00:24

## 2019-04-09 RX ADMIN — LORAZEPAM 1 MG: 1 TABLET ORAL at 23:35

## 2019-04-09 RX ADMIN — TRAMADOL HYDROCHLORIDE 50 MG: 50 TABLET, COATED ORAL at 21:37

## 2019-04-09 RX ADMIN — TRAMADOL HYDROCHLORIDE 50 MG: 50 TABLET, COATED ORAL at 00:24

## 2019-04-09 RX ADMIN — LORAZEPAM 1 MG: 1 TABLET ORAL at 17:22

## 2019-04-09 RX ADMIN — FOLIC ACID: 5 INJECTION, SOLUTION INTRAMUSCULAR; INTRAVENOUS; SUBCUTANEOUS at 00:17

## 2019-04-09 RX ADMIN — PROCHLORPERAZINE EDISYLATE 5 MG: 5 INJECTION INTRAMUSCULAR; INTRAVENOUS at 23:35

## 2019-04-09 RX ADMIN — TRAMADOL HYDROCHLORIDE 50 MG: 50 TABLET, COATED ORAL at 14:55

## 2019-04-09 RX ADMIN — Medication 10 ML: at 00:18

## 2019-04-09 RX ADMIN — LORAZEPAM 1 MG: 1 TABLET ORAL at 05:54

## 2019-04-09 RX ADMIN — ESCITALOPRAM OXALATE 20 MG: 10 TABLET ORAL at 17:22

## 2019-04-09 RX ADMIN — GADOBUTROL 10 ML: 604.72 INJECTION INTRAVENOUS at 20:55

## 2019-04-09 RX ADMIN — LORAZEPAM 1 MG: 2 INJECTION INTRAMUSCULAR; INTRAVENOUS at 16:47

## 2019-04-09 NOTE — PROGRESS NOTES
PCU SHIFT NURSING NOTE Bedside shift change report given to Arely Pinon RN (oncoming nurse) by Mayi Barrientos RN (offgoing nurse). Report included the following information SBAR, Kardex, MAR, Recent Results and Cardiac Rhythm NSR to Sophie Wilkinson Shift Summary:  
2000: Pt lying in bed watching TV, A&O x4. Calm, says nausea is present but very mild. CIWA 1. When asked about pain, pt states he is still havign ongoing abd pain, which he describes as feeling \"sharp, like gas pain\" Hyperactive BS through all quadrants. Abd soft. Pt declines offer of PRN analgesia. 2102: Transport arrives to take pt via wheelchair to imaging. 2245: Paged hospitalist per imaging request. Morphine needed to complete HIDA scan. Radiologist Dave Dinero is requesting hospitalist order 2mg Morphine IVP once. 2304: Administered Morphine as ordered above to pt.  
0015: Pt back from imaging. C/o nausea and back pain 7/10. Administering PRN compazine and ultram. (see MAR, doc flowsheet) CIWA 3. 
0356: CIWA 0. 
0710: Bedside shift change report given to Mayi Barrientos RN (oncoming nurse) by Arely Pinon RN (offgoing nurse). Report included the following information SBAR, Kardex, Intake/Output, MAR, Recent Results and Cardiac Rhythm NSR. Admission Date 4/8/2019 Admission Diagnosis Acute alcoholic hepatitis [W51.86] Intractable nausea and vomiting [R11.2] Consults IP CONSULT TO HOSPITALIST 
IP CONSULT TO GASTROENTEROLOGY Consults []PT []OT []Speech  
[]Case Management  
  
[] Palliative Cardiac Monitoring Order []Yes []No  
 
IV drips []Yes Drip:                            Dose: 
Drip:                            Dose: 
Drip:                            Dose:  
[]No  
 
GI Prophylaxis []Yes []No  
 
 
 
DVT Prophylaxis SCDs:     
     
 Sid stockings:     
  
[] Medication []Contraindicated []None Activity Level Purposeful Rounding every 1-2 hour? []Yes Spencer Score  Total Score: 0 Bed Alarm (If score 3 or >) []Yes  
[] Refused (See signed refusal form in chart) Lee Score  Lee Score: 23 Lee Score (if score 14 or less) []PMT consult  
[]Wound Care consult []Specialty bed  
[] Nutrition consult Needs prior to discharge:  
Home O2 required:   
[]Yes []No  
 If yes, how much O2 required? Other:  
 Last Bowel Movement:    
  
Influenza Vaccine Received Flu Vaccine for Current Season (usually Sept-March): Yes Pneumonia Vaccine Diet Active Orders Diet DIET CLEAR LIQUID  
  
LDAs Peripheral IV 04/08/19 Left Antecubital (Active) Site Assessment Clean, dry, & intact 4/8/2019  9:58 AM  
Phlebitis Assessment 0 4/8/2019  9:58 AM  
Infiltration Assessment 0 4/8/2019  9:58 AM  
Dressing Status Clean, dry, & intact 4/8/2019  9:58 AM  
                  
Urinary Catheter Intake & Output Date 04/07/19 1900 - 04/08/19 0659(Not Admitted) 04/08/19 0700 - 04/09/19 3062 Shift 8669-1007 24 Hour Total 3967-1071 6579-1921 24 Hour Total  
INTAKE  
I.V.   5309(4)  2050 Volume (sodium chloride 0.9 % bolus infusion 1,000 mL)   1000  1000 Volume (0.9% sodium chloride infusion 1,000 mL)   1000  1000 Volume (magnesium sulfate 2 g/50 ml IVPB (premix or compounded))   50  50 Shift Total(mL/kg)   [id number])  0054(87.7) OUTPUT Urine   1000(1)  1000 Urine   1000  1000 Shift Total(mL/kg)   1000(11.4)  1000(11.4) NET   1050  1050 Weight (kg)   87.5 87.5 87.5 Readmission Risk Assessment Tool Score Low Risk 6 Total Score 6 Charlson Comorbidity Score (Age + Comorbid Conditions) Criteria that do not apply:  
 Has Seen PCP in Last 6 Months (Yes=3, No=0) . Living with Significant Other. Assisted Living. LTAC. SNF. or  
Rehab Patient Length of Stay (>5 days = 3) IP Visits Last 12 Months (1-3=4, 4=9, >4=11) Pt. Coverage (Medicare=5 , Medicaid, or Self-Pay=4) Expected Length of Stay - - - Actual Length of Stay 0

## 2019-04-09 NOTE — PROGRESS NOTES
Reason for Admission:   Acute Alcoholic Hepatitis, Intractable Nausea and Vomiting RRAT Score:   6 - low risk Plan for utilizing home health:    no   
                 
Current Advanced Directive/Advance Care Plan: full Code/ not on file Likelihood of Readmission:  Low risk Transition of Care Plan:    Home with f/u appts and pending needs at discharge. Pt is a 47 y.o  male admitted with Acute Alcoholic Hepatitis, Intractable Nausea and Vomiting. Pt was alert, oriented resting in bed. Demographic information verified and all is correct. Pt lives with his wife in a 1 story home with 4 steps to the entrance. Prior to admission, pt was independent with his ADL's and IADL's and driving. Pt works at [employer]. Denies using DME or having home health and rehab in the past. Preferred pharmacy is [facility] in [place]. Pt's wife can transport pt home by car at discharge. Care Management Interventions PCP Verified by CM: Yes(Dr. Saucedo) Mode of Transport at Discharge: Other (see comment)(pt's wife can transport pt home by car) Transition of Care Consult (CM Consult): Discharge Planning Discharge Durable Medical Equipment: No 
Physical Therapy Consult: No 
Occupational Therapy Consult: No 
Speech Therapy Consult: No 
Current Support Network: Lives with Spouse, Own Home(lives with wife in a 1 story home with 4 steps to the entrance) Confirm Follow Up Transport: Family Discharge Location [place] Placement: [place] [facility] [place]

## 2019-04-09 NOTE — PROGRESS NOTES
Gastroenterology Progress Note 4/9/2019 Admit Date: 4/8/2019 Subjective: Follow up for:elevated LFTs, hepatitis, RUQ pain,N/V Feels better with treatment so far given to him. Patient was seen in rounds by me today. HIDA scan done overnight. Current Facility-Administered Medications Medication Dose Route Frequency  sodium chloride (NS) flush 5-40 mL  5-40 mL IntraVENous Q8H  
 sodium chloride (NS) flush 5-40 mL  5-40 mL IntraVENous PRN  
 0.9% sodium chloride infusion  75 mL/hr IntraVENous CONTINUOUS  
 acetaminophen (TYLENOL) tablet 650 mg  650 mg Oral Q6H PRN  prochlorperazine (COMPAZINE) injection 5 mg  5 mg IntraVENous Q8H PRN  
 LORazepam (ATIVAN) tablet 1 mg  1 mg Oral Q6H  
 nicotine (NICODERM CQ) 21 mg/24 hr patch 1 Patch  1 Patch TransDERmal Q24H  pantoprazole (PROTONIX) 40 mg in sodium chloride 0.9% 10 mL injection  40 mg IntraVENous DAILY  LORazepam (ATIVAN) injection 1 mg  1 mg IntraVENous Q1H PRN  
 LORazepam (ATIVAN) injection 2 mg  2 mg IntraVENous Q1H PRN  
 [START ON 4/10/2019] thiamine HCL (B-1) tablet 100 mg  100 mg Oral DAILY  [START ON 9/33/6533] folic acid (FOLVITE) tablet 1 mg  1 mg Oral DAILY  escitalopram oxalate (LEXAPRO) tablet 20 mg  20 mg Oral QPM  
 lisinopril (PRINIVIL, ZESTRIL) tablet 10 mg  10 mg Oral DAILY  traMADol (ULTRAM) tablet 50 mg  50 mg Oral Q6H PRN Objective:  
 
Blood pressure 122/76, pulse 67, temperature 98.3 °F (36.8 °C), resp. rate 18, height 6' 1\" (1.854 m), weight 86.4 kg (190 lb 7.6 oz), SpO2 94 %. No intake/output data recorded. 04/07 1901 - 04/09 0700 In: 2763.8 [I.V.:2763.8] Out: 1000 [Urine:1000] Physical Examination:  
 
 
General:AAO x 3, HEENT:  EOMI, MMM Chest:  CTA, . Heart: S1, S2, RRR 
GI: Soft, some RUQ pain, + bowel sounds Extremities: No  cyanosis CNS: CNs grossly normal. 
 
Data Review Recent Results (from the past 24 hour(s)) SAMPLES BEING HELD  
 Collection Time: 04/08/19  9:51 AM  
Result Value Ref Range SAMPLES BEING HELD 1BLUE 1RED 1SST 1PST 1LAV   
 COMMENT Add-on orders for these samples will be processed based on acceptable specimen integrity and analyte stability, which may vary by analyte. CBC W/O DIFF Collection Time: 04/08/19  9:51 AM  
Result Value Ref Range WBC 4.5 4.1 - 11.1 K/uL  
 RBC 3.17 (L) 4.10 - 5.70 M/uL  
 HGB 10.9 (L) 12.1 - 17.0 g/dL HCT 30.5 (L) 36.6 - 50.3 % MCV 96.2 80.0 - 99.0 FL  
 MCH 34.4 (H) 26.0 - 34.0 PG  
 MCHC 35.7 30.0 - 36.5 g/dL  
 RDW 14.4 11.5 - 14.5 % PLATELET 97 (L) 597 - 400 K/uL MPV 11.5 8.9 - 12.9 FL  
 NRBC 0.0 0  WBC ABSOLUTE NRBC 0.00 0.00 - 0.01 K/uL METABOLIC PANEL, COMPREHENSIVE Collection Time: 04/08/19  9:51 AM  
Result Value Ref Range Sodium 129 (L) 136 - 145 mmol/L Potassium 4.1 3.5 - 5.1 mmol/L Chloride 97 97 - 108 mmol/L  
 CO2 25 21 - 32 mmol/L Anion gap 7 5 - 15 mmol/L Glucose 140 (H) 65 - 100 mg/dL BUN 9 6 - 20 MG/DL Creatinine 0.87 0.70 - 1.30 MG/DL  
 BUN/Creatinine ratio 10 (L) 12 - 20 GFR est AA >60 >60 ml/min/1.73m2 GFR est non-AA >60 >60 ml/min/1.73m2 Calcium 8.1 (L) 8.5 - 10.1 MG/DL Bilirubin, total 3.7 (H) 0.2 - 1.0 MG/DL  
 ALT (SGPT) 105 (H) 12 - 78 U/L  
 AST (SGOT) 224 (H) 15 - 37 U/L Alk. phosphatase 362 (H) 45 - 117 U/L Protein, total 6.0 (L) 6.4 - 8.2 g/dL Albumin 2.8 (L) 3.5 - 5.0 g/dL Globulin 3.2 2.0 - 4.0 g/dL A-G Ratio 0.9 (L) 1.1 - 2.2 LIPASE Collection Time: 04/08/19  9:51 AM  
Result Value Ref Range Lipase 180 73 - 393 U/L MAGNESIUM Collection Time: 04/08/19  9:51 AM  
Result Value Ref Range Magnesium 1.6 1.6 - 2.4 mg/dL PROTHROMBIN TIME + INR Collection Time: 04/08/19  9:51 AM  
Result Value Ref Range INR 1.1 0.9 - 1.1 Prothrombin time 11.4 (H) 9.0 - 11.1 sec ETHYL ALCOHOL Collection Time: 04/08/19  9:51 AM  
Result Value Ref Range ALCOHOL(ETHYL),SERUM 42 (H) <10 MG/DL  
LD Collection Time: 04/08/19  9:51 AM  
Result Value Ref Range  85 - 241 U/L  
URINALYSIS W/ REFLEX CULTURE Collection Time: 04/08/19 10:14 AM  
Result Value Ref Range Color DARK YELLOW Appearance CLEAR CLEAR Specific gravity 1.010 1.003 - 1.030    
 pH (UA) 6.0 5.0 - 8.0 Protein NEGATIVE  NEG mg/dL Glucose NEGATIVE  NEG mg/dL Ketone NEGATIVE  NEG mg/dL Blood NEGATIVE  NEG Urobilinogen 0.2 0.2 - 1.0 EU/dL Nitrites NEGATIVE  NEG Leukocyte Esterase NEGATIVE  NEG    
 WBC 0-4 0 - 4 /hpf  
 RBC 0-5 0 - 5 /hpf Epithelial cells FEW FEW /lpf Bacteria NEGATIVE  NEG /hpf  
 UA:UC IF INDICATED CULTURE NOT INDICATED BY UA RESULT CNI Hyaline cast 0-2 0 - 5 /lpf  
BILIRUBIN, CONFIRM Collection Time: 04/08/19 10:14 AM  
Result Value Ref Range Bilirubin UA, confirm POSITIVE (A) NEG    
EKG, 12 LEAD, INITIAL Collection Time: 04/08/19 11:17 AM  
Result Value Ref Range Ventricular Rate 81 BPM  
 Atrial Rate 81 BPM  
 P-R Interval 152 ms QRS Duration 96 ms  
 Q-T Interval 428 ms QTC Calculation (Bezet) 497 ms Calculated P Axis 24 degrees Calculated R Axis 4 degrees Calculated T Axis 9 degrees Diagnosis Sinus rhythm with marked sinus arrhythmia Prolonged QT When compared with ECG of 08-MAR-2019 13:54, Left bundle branch block is no longer present TSH 3RD GENERATION Collection Time: 04/08/19  3:46 PM  
Result Value Ref Range TSH 1.21 0.36 - 3.74 uIU/mL FERRITIN Collection Time: 04/08/19  3:46 PM  
Result Value Ref Range Ferritin 2,079 (H) 26 - 388 NG/ML  
HEPATITIS PANEL, ACUTE Collection Time: 04/08/19  3:46 PM  
Result Value Ref Range Hepatitis A, IgM NONREACTIVE NR    
 __ Hepatitis B surface Ag 0.40 Index Hep B surface Ag Interp. NEGATIVE  NEG    
 __  Hepatitis B core, IgM NONREACTIVE NR    
 __        
 Hep C  virus Ab Interp. NONREACTIVE NR Hep C  virus Ab comment Method used is [employer] OCCULT BLOOD, STOOL Collection Time: 04/08/19  5:16 PM  
Result Value Ref Range Occult blood, stool NEGATIVE  NEG    
HGB & HCT Collection Time: 04/08/19  6:05 PM  
Result Value Ref Range HGB 10.1 (L) 12.1 - 17.0 g/dL HCT 28.5 (L) 36.6 - 50.3 % TYPE & SCREEN Collection Time: 04/08/19  6:05 PM  
Result Value Ref Range Crossmatch Expiration 04/11/2019 ABO/Rh(D) A POSITIVE Antibody screen NEG   
CBC W/O DIFF Collection Time: 04/09/19  4:01 AM  
Result Value Ref Range WBC 3.3 (L) 4.1 - 11.1 K/uL  
 RBC 2.56 (L) 4.10 - 5.70 M/uL HGB 9.0 (L) 12.1 - 17.0 g/dL HCT 25.6 (L) 36.6 - 50.3 % .0 (H) 80.0 - 99.0 FL  
 MCH 35.2 (H) 26.0 - 34.0 PG  
 MCHC 35.2 30.0 - 36.5 g/dL  
 RDW 14.5 11.5 - 14.5 % PLATELET 78 (L) 053 - 400 K/uL MPV 10.8 8.9 - 12.9 FL  
 NRBC 0.0 0  WBC ABSOLUTE NRBC 0.00 0.00 - 0.01 K/uL METABOLIC PANEL, COMPREHENSIVE Collection Time: 04/09/19  4:01 AM  
Result Value Ref Range Sodium 133 (L) 136 - 145 mmol/L Potassium 3.7 3.5 - 5.1 mmol/L Chloride 104 97 - 108 mmol/L  
 CO2 23 21 - 32 mmol/L Anion gap 6 5 - 15 mmol/L Glucose 84 65 - 100 mg/dL BUN 5 (L) 6 - 20 MG/DL Creatinine 0.65 (L) 0.70 - 1.30 MG/DL  
 BUN/Creatinine ratio 8 (L) 12 - 20 GFR est AA >60 >60 ml/min/1.73m2 GFR est non-AA >60 >60 ml/min/1.73m2 Calcium 7.2 (L) 8.5 - 10.1 MG/DL Bilirubin, total 3.4 (H) 0.2 - 1.0 MG/DL  
 ALT (SGPT) 79 (H) 12 - 78 U/L  
 AST (SGOT) 189 (H) 15 - 37 U/L Alk. phosphatase 297 (H) 45 - 117 U/L Protein, total 5.0 (L) 6.4 - 8.2 g/dL Albumin 2.2 (L) 3.5 - 5.0 g/dL Globulin 2.8 2.0 - 4.0 g/dL A-G Ratio 0.8 (L) 1.1 - 2.2 Recent Labs 04/09/19 
0401 04/08/19 
1805 04/08/19 
0333 WBC 3.3*  --  4.5 HGB 9.0* 10.1* 10.9* HCT 25.6* 28.5* 30.5* PLT 78*  --  97* Recent Labs 04/09/19 
0401 04/08/19 
6361 * 129*  
K 3.7 4.1  97 CO2 23 25 BUN 5* 9  
CREA 0.65* 0.87 GLU 84 140* CA 7.2* 8.1*  
MG  --  1.6 Recent Labs 04/09/19 
0401 04/08/19 
3071 SGOT 189* 224* * 362* TP 5.0* 6.0* ALB 2.2* 2.8*  
GLOB 2.8 3.2 LPSE  --  180 Recent Labs 04/08/19 
3930 INR 1.1 PTP 11.4* Recent Labs 04/08/19 
1546 FERR 2,079* No results found for: FOL, RBCF No results for input(s): PH, PCO2, PO2 in the last 72 hours. No results for input(s): CPK, CKNDX, TROIQ in the last 72 hours. No lab exists for component: CPKMB No results found for: CHOL, CHOLX, CHLST, CHOLV, HDL, LDL, LDLC, DLDLP, TGLX, TRIGL, TRIGP, CHHD, CHHDX No components found for: [place] Lab Results Component Value Date/Time Color DARK YELLOW 04/08/2019 10:14 AM  
 Appearance CLEAR 04/08/2019 10:14 AM  
 Specific gravity 1.010 04/08/2019 10:14 AM  
 pH (UA) 6.0 04/08/2019 10:14 AM  
 Protein NEGATIVE  04/08/2019 10:14 AM  
 Glucose NEGATIVE  04/08/2019 10:14 AM  
 Ketone NEGATIVE  04/08/2019 10:14 AM  
 Bilirubin NEGATIVE  03/08/2019 02:32 PM  
 Urobilinogen 0.2 04/08/2019 10:14 AM  
 Nitrites NEGATIVE  04/08/2019 10:14 AM  
 Leukocyte Esterase NEGATIVE  04/08/2019 10:14 AM  
 Epithelial cells FEW 04/08/2019 10:14 AM  
 Bacteria NEGATIVE  04/08/2019 10:14 AM  
 WBC 0-4 04/08/2019 10:14 AM  
 RBC 0-5 04/08/2019 10:14 AM  
 
  
ROS: -CP, SOB, Dysuria, palpitations, cough. Assessment: 
 
Active Problems: 
  Acute alcoholic hepatitis (6/2/5954) Intractable nausea and vomiting (4/8/2019) Plan/Discussion:  
 
His US,CT and HIDA were reviewed. Will get surgical input re: GB findings. Treat as alcoholic liver disease with treatment as suggested. Maddrey Score of 3 only. LFTs slowly getting better. IV PPI, IV antiemetics prn and IVF. High ferritin is likely from hepatitis/Acute phase reactant but agree with HFE gene testing. Start full liquids after seen by surgery. Signed By: Erlinda Rubio.  Kaushal Muñoz MD 
 
4/9/2019  9:30 AM

## 2019-04-09 NOTE — CDMP QUERY
Account Number: [de-identified] MRN: [id number] Patient: Annabel Guallpa Created: [id number] Clinician Name: Lisa Melo RN, CCDS Dr. Shamar Boswell : 
Patient admitted with acute alcoholic hepatitis, noted to have low Na. If possible, please document in progress notes and d/c summary if you are evaluating and/or treating any of the following:  
 
=> Hyponatremia POA 
=> Chronic Hyponatremia POA 
=> Other explanation of clinical findings  
=> Clinically Undetermined (no explanation for clinical findings) The medical record reflects the following: 
   Risk Factors: 53M w/ weight loss, vomiting, ETOH abuse,daily drinker Clinical Indicators: Na level 129 - 133 Treatment: received 2L IVF in ED, NSS @ 75 ml/hr, lab monitoring.   
Thank Irina Cespedes RN, CCDS

## 2019-04-09 NOTE — PROGRESS NOTES
Pt transferred from PCU to the Oncology unit. CM gave handoff report to Wei Morlaes CM. Edwina Pantoja, [address] [place], [place] [address]

## 2019-04-09 NOTE — PROGRESS NOTES
Patient states charge nurse told him that he could eat if MRI could not see him until tomorrow. Patient requested that MRI see him tomorrow instead of today and to cancel his appointment for today so that he could eat.

## 2019-04-09 NOTE — PROGRESS NOTES
TELE-HOSPITALIST CROSS COVER NOTE: 
 
Visit Vitals /73 (BP 1 Location: Left arm, BP Patient Position: At rest) Pulse 98 Temp 98.4 °F (36.9 °C) Resp 18 Ht 6' 1\" (1.854 m) Wt 87.5 kg (192 lb 14.4 oz) SpO2 95% BMI 25.45 kg/m² D/W RN; medical records reviewed; Morphine 2mg IV x 1 ordered for pain control. Noemí Iglesias

## 2019-04-09 NOTE — PROGRESS NOTES
Hospitalist Progress Note NAME: Eric Parker :  1965 MRN:  [id number] Assessment / Plan: 
Vomiting since January, worse in past month: Cannabis emesis Sd vs Chronic liver Ds. So far better, monitor, GI help appreciated. Weight loss 15-20 lbs since January by his report Elevated LFTs and bilirubin. Could be from alcoholic hepatitis, rule out cholecystitis, so far LFT trending down Mild RUQ pain with new thickened gallbladder wall on US and CT today compared to US last month, HIDA is inconclusive, but all this  impress me a parenchymal disease more than gallbladder per se, will check MRI abdomen (iron deposition), has hepatomegaly and high ferritin, check iron profile Acute anemia OBS is negative, check iron profile Chronic alcohol abuse, high risk for withdrawal c/w CIWA Acute thrombocytopenia platelet 97 with splenomegaly, ?from cirrhosis or if primary hematology process such as ITP, start folic acid Tobacco abuse Hyponatremia: from hemodilution, so far improving, monitor. Marijuana abuse intermittently Overweight: BMI 25.13 Surrogate decision maker:  wife Code status: Full Prophylaxis: SCD's Recommended Disposition: Home w/Family Subjective: Chief Complaint / Reason for Physician Visit \"I feel better\". Discussed with RN events overnight. Review of Systems: 
Symptom Y/N Comments  Symptom Y/N Comments Fever/Chills    Chest Pain Poor Appetite    Edema Cough    Abdominal Pain y Sputum    Joint Pain SOB/TALBERT    Pruritis/Rash Nausea/vomit    Tolerating PT/OT Diarrhea    Tolerating Diet y Constipation    Other Could NOT obtain due to:   
 
Objective: VITALS:  
Last 24hrs VS reviewed since prior progress note. Most recent are: 
Patient Vitals for the past 24 hrs: 
 Temp Pulse Resp BP SpO2  
19 0356 97.9 °F (36.6 °C) 81 14 130/72 94 % 19 0017 97.9 °F (36.6 °C) 84 16 143/81 97 % 04/08/19 2005 98.4 °F (36.9 °C) 98 18 126/73 95 % 04/08/19 1759 98.8 °F (37.1 °C) 76 16  99 % 04/08/19 1733 99.5 °F (37.5 °C) 78 16 128/84 99 % 04/08/19 1730    128/84   
04/08/19 1530  80 15 143/88 98 % 04/08/19 1400    134/85 96 % 04/08/19 1300  88 16 129/82 98 % 04/08/19 1239  86  135/83 99 % 04/08/19 0941 98.1 °F (36.7 °C) 89 18 128/78 100 % Intake/Output Summary (Last 24 hours) at 4/9/2019 4346 Last data filed at 4/9/2019 6333 Gross per 24 hour Intake 2763.75 ml Output 1000 ml Net 1763.75 ml PHYSICAL EXAM: 
General: WD, WN. Alert, cooperative, no acute distress   
EENT:  EOMI. jaundiced sclerae. MMM Resp:  Coarse BS 
CV:  Regular  rhythm,  No edema GI:  Soft, Non distended, Non tender.  +Bowel sounds Neurologic:  Alert and oriented X 3, normal speech, Psych:   Good insight. Not anxious nor agitated Skin:  No rashes. + jaundice Reviewed most current lab test results and cultures  YES Reviewed most current radiology test results   YES Review and summation of old records today    NO Reviewed patient's current orders and MAR    YES 
PMH/SH reviewed - no change compared to H&P 
________________________________________________________________________ Care Plan discussed with: 
  Comments Patient y Family RN y   
Care Manager Consultant Multidiciplinary team rounds were held today with , nursing, pharmacist and clinical coordinator. Patient's plan of care was discussed; medications were reviewed and discharge planning was addressed. ________________________________________________________________________ Total NON critical care TIME:  35  Minutes Total CRITICAL CARE TIME Spent:   Minutes non procedure based Comments >50% of visit spent in counseling and coordination of care y   
________________________________________________________________________ Chryl Bay, MD  
 
 Procedures: see electronic medical records for all procedures/Xrays and details which were not copied into this note but were reviewed prior to creation of Plan. LABS: 
I reviewed today's most current labs and imaging studies. Pertinent labs include: 
Recent Labs 04/09/19 
0401 04/08/19 
1805 04/08/19 
5900 WBC 3.3*  --  4.5 HGB 9.0* 10.1* 10.9* HCT 25.6* 28.5* 30.5* PLT 78*  --  97* Recent Labs 04/09/19 
0401 04/08/19 
5716 * 129*  
K 3.7 4.1  97 CO2 23 25 GLU 84 140* BUN 5* 9  
CREA 0.65* 0.87 CA 7.2* 8.1*  
MG  --  1.6 ALB 2.2* 2.8* TBILI 3.4* 3.7* SGOT 189* 224* ALT 79* 105* INR  --  1.1 Signed: Desmond Vasquez MD

## 2019-04-09 NOTE — PROGRESS NOTES
Dr. Nish Murdock paged about the patient's request about the MRI. Dr. Nish Murdock stated that if the patient did not do the MRI tonight, to cancel the procedure and discharge the patient tomorrow and to do an outpatient MRI in the future. Patient was informed of this information and decided to continue with the MRI tonight and to not eat before the procedure.

## 2019-04-09 NOTE — PROGRESS NOTES
Oncology End of Shift Note Bedside shift change report given to Santosh Horton RN (incoming nurse) by Noemi Holley (outgoing nurse) on [facility]. Report included the following information SBAR, Kardex, ED Summary, Intake/Output, MAR and Recent Results. Shift Summary:  
Patient was admitted to the unit this afternoon. Patient has been going off the unit outside into the courtyard. Patient is up ad alex and oriented x4. Patient is scheduled for an MRI tonight. Patient complained of right upper abdominal pain once, was given tramadol per orders. No further complaint of pain. Patient also complained of nausea, was given compazine, no further complaints of nausea. Patient was complaining of anxiety, was given ativan, no further complaints of anxiety. Issues for Physician to Address:  none Patient on Cardiac Monitoring? [] Yes 
[x] No 
 
Rhythm:   
 
 
 
Shift Events Noemi Holley

## 2019-04-09 NOTE — PROGRESS NOTES
Problem: Falls - Risk of 
Goal: *Absence of Falls Description Document Rigoberto Freire Fall Risk and appropriate interventions in the flowsheet. Outcome: Progressing Towards Goal 
  
Problem: Patient Education: Go to Patient Education Activity Goal: Patient/Family Education Outcome: Progressing Towards Goal 
  
Problem: Pain - Acute Goal: *Control of acute pain Outcome: Progressing Towards Goal 
  
Problem: Patient Education: Go to Patient Education Activity Goal: Patient/Family Education Outcome: Progressing Towards Goal 
  
Problem: Alcohol Withdrawal 
Goal: *STG: Participates in treatment plan Outcome: Progressing Towards Goal 
Goal: *STG: Remains safe in hospital 
Outcome: Progressing Towards Goal 
Goal: *STG: Seeks staff when symptoms of withdrawal increase Outcome: Progressing Towards Goal 
Goal: *STG: Complies with medication therapy Outcome: Progressing Towards Goal 
Goal: *STG: Will identify negative impact of chemical dependency including the use of tobacco, alcohol, and other substances Outcome: Progressing Towards Goal 
Goal: *STG: Verbalizes abstinence as an achievable goal 
Outcome: Progressing Towards Goal 
Goal: *STG: Maintains appropriate nutrition and hydration Outcome: Progressing Towards Goal 
Goal: *STG: Vital signs within defined limits Outcome: Progressing Towards Goal 
Goal: Interventions Outcome: Progressing Towards Goal 
  
Problem: Patient Education: Go to Patient Education Activity Goal: Patient/Family Education Outcome: Progressing Towards Goal 
  
Problem: Hypertension Goal: *Blood pressure within specified parameters Outcome: Progressing Towards Goal 
Goal: *Fluid volume balance Outcome: Progressing Towards Goal 
Goal: *Labs within defined limits Outcome: Progressing Towards Goal 
  
Problem: Patient Education: Go to Patient Education Activity Goal: Patient/Family Education Outcome: Progressing Towards Goal

## 2019-04-09 NOTE — CONSULTS
Surgery Consult    Subjective:   Patient 47 y.o.  male currently admitted with acute EtoH hepatitis. Presents with N/V. This has been chronic since January. Only pain he describes is lower abd cramping after a violent vomiting episode. Onset of symptoms was gradual with unchanged course since that time. Patient denies melena. +Occational irregular bowels. Symptoms are aggravated by none. Symptoms improve with rest.     US 4/8: IMPRESSION:  1. Hepatomegaly and hepatic steatosis. 2. Mild hepatomegaly  3. Mild gallbladder wall thickening. This can be seen in chronic liver disease. No gallstones or tenderness. HIDA 4/8: IMPRESSION: No convincing activity within the gallbladder despite morphine  administration. This could indicate cystic duct obstruction or gallbladder  Dysfunction. Past Medical & Surgical History:  Past Medical History:   Diagnosis Date    Hypertension       Past Surgical History:   Procedure Laterality Date    HX APPENDECTOMY      HX ORTHOPAEDIC      Left wrist    HX ORTHOPAEDIC      right knee    HX ORTHOPAEDIC      herniated disc       Social History:  Social History     Socioeconomic History    Marital status: SINGLE     Spouse name: Not on file    Number of children: Not on file    Years of education: Not on file    Highest education level: Not on file   Occupational History    Not on file   Social Needs    Financial resource strain: Not on file    Food insecurity:     Worry: Not on file     Inability: Not on file    Transportation needs:     Medical: Not on file     Non-medical: Not on file   Tobacco Use    Smoking status: Current Every Day Smoker     Packs/day: 1.50   Substance and Sexual Activity    Alcohol use:  Yes     Alcohol/week: 3.0 oz     Types: 6 Cans of beer per week    Drug use: No    Sexual activity: Not on file   Lifestyle    Physical activity:     Days per week: Not on file     Minutes per session: Not on file    Stress: Not on file   Relationships    Social connections:     Talks on phone: Not on file     Gets together: Not on file     Attends Tenriism service: Not on file     Active member of club or organization: Not on file     Attends meetings of clubs or organizations: Not on file     Relationship status: Not on file    Intimate partner violence:     Fear of current or ex partner: Not on file     Emotionally abused: Not on file     Physically abused: Not on file     Forced sexual activity: Not on file   Other Topics Concern    Not on file   Social History Narrative    Not on file        Family History:  Family History   Adopted: Yes        Prior to Admission Medications:  Prior to Admission Medications   Prescriptions Last Dose Informant Patient Reported? Taking? ALPRAZolam (XANAX) 0.5 mg tablet 2019 at Unknown time Self Yes Yes   Sig: Take 0.5 mg by mouth two (2) times daily as needed for Anxiety. Pseudoephedrine-Ibuprofen (ADVIL COLD AND SINUS)  mg cap 2019 at Unknown time Self Yes Yes   Sig: Take 1 Cap by mouth two (2) times daily as needed (cold symptoms). albuterol (PROVENTIL HFA, VENTOLIN HFA, PROAIR HFA) 90 mcg/actuation inhaler 3/15/2019 at Unknown time Self Yes Yes   Sig: Take 2 Puffs by inhalation every four (4) hours as needed for Wheezing or Shortness of Breath. albuterol (PROVENTIL VENTOLIN) 2.5 mg /3 mL (0.083 %) nebulizer solution 3/15/2019 at Unknown time Self Yes Yes   Si.5 mg by Nebulization route two (2) times daily as needed for Wheezing or Shortness of Breath. ascorbic acid, vitamin C, (VITAMIN C) 500 mg tablet 2019 at Unknown time Self Yes Yes   Sig: Take 1,000-5,000 mg by mouth daily as needed (cold). escitalopram oxalate (LEXAPRO) 20 mg tablet 2019 at Unknown time Self Yes Yes   Sig: Take 20 mg by mouth every evening. ibuprofen (MOTRIN) 200 mg tablet 2019 at Unknown time Self Yes Yes   Sig: Take 400-600 mg by mouth every six (6) hours as needed for Pain.    lisinopril (Rosa Loss) 20 mg tablet 4/8/2019 at Unknown time Self Yes Yes   Sig: Take 10 mg by mouth two (2) times a day. mv-mn/folic ZUDN/LSLCBX/RKH004 (DAVID MULTIVITAMIN FOR MEN PO) 4/8/2019 at Unknown time Self Yes Yes   Sig: Take 1 Tab by mouth daily. naproxen (NAPROSYN) 500 mg tablet Not Taking at Unknown time Self Yes No   Sig: Take 500 mg by mouth two (2) times daily as needed for Pain. ondansetron (ZOFRAN ODT) 4 mg disintegrating tablet 4/7/2019 at Unknown time Self No Yes   Sig: Take 1 Tab by mouth every eight (8) hours as needed for Nausea. traMADol (ULTRAM) 50 mg tablet 4/1/2019 at Unknown time Self Yes Yes   Sig: Take  mg by mouth two (2) times a day. Facility-Administered Medications: None       Allergies:  No Known Allergies    Review of Systems  A comprehensive review of systems was negative except for that written in the HPI. Objective:     Exam:    Visit Vitals  /71 (BP 1 Location: Right arm, BP Patient Position: At rest)   Pulse 77   Temp 98.5 °F (36.9 °C)   Resp 16   Ht 6' 1\" (1.854 m)   Wt 86.4 kg (190 lb 7.6 oz)   SpO2 94%   BMI 25.13 kg/m²     General appearance: alert, cooperative, no distress, appears stated age  Head: Normocephalic, without obvious abnormality, atraumatic  Neck: supple, symmetrical, trachea midline, no adenopathy, thyroid: not enlarged, symmetric, no tenderness/mass/nodules, no carotid bruit and no JVD  Lungs: clear to auscultation bilaterally  Heart: regular rate and rhythm, S1, S2 normal, no murmur, click, rub or gallop  Abdomen: soft, non-tender.  Bowel sounds normal. No masses,  no organomegaly  Extremities: extremities normal, atraumatic, no cyanosis or edema  Skin: Skin color, texture, turgor normal. No rashes or lesions      Data Review  Recent Results (from the past 24 hour(s))   OCCULT BLOOD, STOOL    Collection Time: 04/08/19  5:16 PM   Result Value Ref Range    Occult blood, stool NEGATIVE  NEG     HGB & HCT    Collection Time: 04/08/19  6:05 PM   Result Value Ref Range    HGB 10.1 (L) 12.1 - 17.0 g/dL    HCT 28.5 (L) 36.6 - 50.3 %   TYPE & SCREEN    Collection Time: 04/08/19  6:05 PM   Result Value Ref Range    Crossmatch Expiration 04/11/2019     ABO/Rh(D) A POSITIVE     Antibody screen NEG    CBC W/O DIFF    Collection Time: 04/09/19  4:01 AM   Result Value Ref Range    WBC 3.3 (L) 4.1 - 11.1 K/uL    RBC 2.56 (L) 4.10 - 5.70 M/uL    HGB 9.0 (L) 12.1 - 17.0 g/dL    HCT 25.6 (L) 36.6 - 50.3 %    .0 (H) 80.0 - 99.0 FL    MCH 35.2 (H) 26.0 - 34.0 PG    MCHC 35.2 30.0 - 36.5 g/dL    RDW 14.5 11.5 - 14.5 %    PLATELET 78 (L) 785 - 400 K/uL    MPV 10.8 8.9 - 12.9 FL    NRBC 0.0 0  WBC    ABSOLUTE NRBC 0.00 0.00 - 6.09 K/uL   METABOLIC PANEL, COMPREHENSIVE    Collection Time: 04/09/19  4:01 AM   Result Value Ref Range    Sodium 133 (L) 136 - 145 mmol/L    Potassium 3.7 3.5 - 5.1 mmol/L    Chloride 104 97 - 108 mmol/L    CO2 23 21 - 32 mmol/L    Anion gap 6 5 - 15 mmol/L    Glucose 84 65 - 100 mg/dL    BUN 5 (L) 6 - 20 MG/DL    Creatinine 0.65 (L) 0.70 - 1.30 MG/DL    BUN/Creatinine ratio 8 (L) 12 - 20      GFR est AA >60 >60 ml/min/1.73m2    GFR est non-AA >60 >60 ml/min/1.73m2    Calcium 7.2 (L) 8.5 - 10.1 MG/DL    Bilirubin, total 3.4 (H) 0.2 - 1.0 MG/DL    ALT (SGPT) 79 (H) 12 - 78 U/L    AST (SGOT) 189 (H) 15 - 37 U/L    Alk. phosphatase 297 (H) 45 - 117 U/L    Protein, total 5.0 (L) 6.4 - 8.2 g/dL    Albumin 2.2 (L) 3.5 - 5.0 g/dL    Globulin 2.8 2.0 - 4.0 g/dL    A-G Ratio 0.8 (L) 1.1 - 2.2         Assessment:     Active Problems:    Acute alcoholic hepatitis (9/5/7510)      Intractable nausea and vomiting (4/8/2019)        Plan:     Reviewed imaging and discussed with pt. Agree that this does not sound like biliary colic. Cont treatment for hepatitis. Has a MRI pending. Adv diet after the MRI.     We discussed the possibility of a lap isiah at some future date for biliary dysfunction if symptoms develop, but not on this admission if symptoms improve. Thank you for this consult.

## 2019-04-10 VITALS
HEART RATE: 75 BPM | RESPIRATION RATE: 20 BRPM | BODY MASS INDEX: 25.24 KG/M2 | DIASTOLIC BLOOD PRESSURE: 80 MMHG | OXYGEN SATURATION: 96 % | HEIGHT: 73 IN | TEMPERATURE: 97.9 F | WEIGHT: 190.48 LBS | SYSTOLIC BLOOD PRESSURE: 108 MMHG

## 2019-04-10 PROBLEM — D64.9 ANEMIA: Status: ACTIVE | Noted: 2019-04-10

## 2019-04-10 PROBLEM — D69.6 THROMBOCYTOPENIA (HCC): Status: ACTIVE | Noted: 2019-04-10

## 2019-04-10 LAB
ACTIN IGG SERPL-ACNC: 8 UNITS (ref 0–19)
ALBUMIN SERPL-MCNC: 2.4 G/DL (ref 3.5–5)
ALBUMIN/GLOB SERPL: 0.8 {RATIO} (ref 1.1–2.2)
ALP SERPL-CCNC: 322 U/L (ref 45–117)
ALT SERPL-CCNC: 74 U/L (ref 12–78)
ANA SER QL: NEGATIVE
ANION GAP SERPL CALC-SCNC: 6 MMOL/L (ref 5–15)
AST SERPL-CCNC: 171 U/L (ref 15–37)
BASOPHILS # BLD: 0 K/UL (ref 0–0.1)
BASOPHILS NFR BLD: 0 % (ref 0–1)
BILIRUB SERPL-MCNC: 3.6 MG/DL (ref 0.2–1)
BUN SERPL-MCNC: 2 MG/DL (ref 6–20)
BUN/CREAT SERPL: 3 (ref 12–20)
CALCIUM SERPL-MCNC: 8 MG/DL (ref 8.5–10.1)
CERULOPLASMIN SERPL-MCNC: 18.4 MG/DL (ref 16–31)
CHLORIDE SERPL-SCNC: 106 MMOL/L (ref 97–108)
CO2 SERPL-SCNC: 25 MMOL/L (ref 21–32)
CREAT SERPL-MCNC: 0.74 MG/DL (ref 0.7–1.3)
DIFFERENTIAL METHOD BLD: ABNORMAL
EOSINOPHIL # BLD: 0.1 K/UL (ref 0–0.4)
EOSINOPHIL NFR BLD: 2 % (ref 0–7)
ERYTHROCYTE [DISTWIDTH] IN BLOOD BY AUTOMATED COUNT: 14.5 % (ref 11.5–14.5)
GLOBULIN SER CALC-MCNC: 3.1 G/DL (ref 2–4)
GLUCOSE SERPL-MCNC: 103 MG/DL (ref 65–100)
HCT VFR BLD AUTO: 27.6 % (ref 36.6–50.3)
HGB BLD-MCNC: 9.4 G/DL (ref 12.1–17)
IMM GRANULOCYTES # BLD AUTO: 0 K/UL (ref 0–0.04)
IMM GRANULOCYTES NFR BLD AUTO: 0 % (ref 0–0.5)
LYMPHOCYTES # BLD: 1.3 K/UL (ref 0.8–3.5)
LYMPHOCYTES NFR BLD: 40 % (ref 12–49)
MAGNESIUM SERPL-MCNC: 1.6 MG/DL (ref 1.6–2.4)
MCH RBC QN AUTO: 34.9 PG (ref 26–34)
MCHC RBC AUTO-ENTMCNC: 34.1 G/DL (ref 30–36.5)
MCV RBC AUTO: 102.6 FL (ref 80–99)
MITOCHONDRIA M2 IGG SER-ACNC: <20 UNITS (ref 0–20)
MONOCYTES # BLD: 0.3 K/UL (ref 0–1)
MONOCYTES NFR BLD: 8 % (ref 5–13)
NEUTS SEG # BLD: 1.7 K/UL (ref 1.8–8)
NEUTS SEG NFR BLD: 50 % (ref 32–75)
NRBC # BLD: 0 K/UL (ref 0–0.01)
NRBC BLD-RTO: 0 PER 100 WBC
PLATELET # BLD AUTO: 95 K/UL (ref 150–400)
PMV BLD AUTO: 11.1 FL (ref 8.9–12.9)
POTASSIUM SERPL-SCNC: 3.8 MMOL/L (ref 3.5–5.1)
PROT SERPL-MCNC: 5.5 G/DL (ref 6.4–8.2)
RBC # BLD AUTO: 2.69 M/UL (ref 4.1–5.7)
SODIUM SERPL-SCNC: 137 MMOL/L (ref 136–145)
WBC # BLD AUTO: 3.3 K/UL (ref 4.1–11.1)

## 2019-04-10 PROCEDURE — C9113 INJ PANTOPRAZOLE SODIUM, VIA: HCPCS | Performed by: HOSPITALIST

## 2019-04-10 PROCEDURE — 83735 ASSAY OF MAGNESIUM: CPT

## 2019-04-10 PROCEDURE — 80053 COMPREHEN METABOLIC PANEL: CPT

## 2019-04-10 PROCEDURE — 74011000250 HC RX REV CODE- 250: Performed by: HOSPITALIST

## 2019-04-10 PROCEDURE — 85025 COMPLETE CBC W/AUTO DIFF WBC: CPT

## 2019-04-10 PROCEDURE — 74011250636 HC RX REV CODE- 250/636: Performed by: HOSPITALIST

## 2019-04-10 PROCEDURE — 36415 COLL VENOUS BLD VENIPUNCTURE: CPT

## 2019-04-10 PROCEDURE — 74011250637 HC RX REV CODE- 250/637: Performed by: HOSPITALIST

## 2019-04-10 PROCEDURE — 74011250637 HC RX REV CODE- 250/637: Performed by: INTERNAL MEDICINE

## 2019-04-10 RX ORDER — LORAZEPAM 1 MG/1
1 TABLET ORAL
Qty: 18 TAB | Refills: 0 | Status: SHIPPED | OUTPATIENT
Start: 2019-04-10

## 2019-04-10 RX ORDER — OMEPRAZOLE 40 MG/1
40 CAPSULE, DELAYED RELEASE ORAL DAILY
Qty: 30 CAP | Refills: 1 | Status: SHIPPED | OUTPATIENT
Start: 2019-04-10

## 2019-04-10 RX ORDER — IBUPROFEN 200 MG
1 TABLET ORAL EVERY 24 HOURS
Qty: 30 PATCH | Refills: 0 | Status: SHIPPED | OUTPATIENT
Start: 2019-04-10 | End: 2019-05-10

## 2019-04-10 RX ORDER — LISINOPRIL 10 MG/1
10 TABLET ORAL DAILY
Qty: 30 TAB | Refills: 1 | Status: SHIPPED | OUTPATIENT
Start: 2019-04-11

## 2019-04-10 RX ORDER — PROCHLORPERAZINE MALEATE 10 MG
10 TABLET ORAL
Qty: 30 TAB | Refills: 0 | Status: SHIPPED | OUTPATIENT
Start: 2019-04-10 | End: 2019-04-17

## 2019-04-10 RX ORDER — FOLIC ACID 1 MG/1
1 TABLET ORAL DAILY
Qty: 30 TAB | Refills: 1 | Status: SHIPPED | OUTPATIENT
Start: 2019-04-11

## 2019-04-10 RX ORDER — ASPIRIN 325 MG/1
100 TABLET, FILM COATED ORAL DAILY
Qty: 30 TAB | Refills: 1 | Status: SHIPPED | OUTPATIENT
Start: 2019-04-11

## 2019-04-10 RX ORDER — TRAMADOL HYDROCHLORIDE 50 MG/1
50 TABLET ORAL
Qty: 20 TAB | Refills: 0 | Status: SHIPPED | OUTPATIENT
Start: 2019-04-10 | End: 2019-04-15

## 2019-04-10 RX ADMIN — TRAMADOL HYDROCHLORIDE 50 MG: 50 TABLET, COATED ORAL at 09:28

## 2019-04-10 RX ADMIN — PROCHLORPERAZINE EDISYLATE 5 MG: 5 INJECTION INTRAMUSCULAR; INTRAVENOUS at 06:31

## 2019-04-10 RX ADMIN — SODIUM CHLORIDE 40 MG: 9 INJECTION, SOLUTION INTRAMUSCULAR; INTRAVENOUS; SUBCUTANEOUS at 09:27

## 2019-04-10 RX ADMIN — LISINOPRIL 10 MG: 5 TABLET ORAL at 09:27

## 2019-04-10 RX ADMIN — SODIUM CHLORIDE 75 ML/HR: 900 INJECTION, SOLUTION INTRAVENOUS at 09:35

## 2019-04-10 RX ADMIN — LORAZEPAM 1 MG: 1 TABLET ORAL at 12:32

## 2019-04-10 RX ADMIN — Medication 100 MG: at 09:27

## 2019-04-10 RX ADMIN — LORAZEPAM 1 MG: 1 TABLET ORAL at 06:30

## 2019-04-10 RX ADMIN — FOLIC ACID 1 MG: 1 TABLET ORAL at 09:28

## 2019-04-10 NOTE — PROGRESS NOTES
Discharge instructions reviewed and received along with prescriptions with wife. Verbalizes understanding. Condition stable

## 2019-04-10 NOTE — DISCHARGE INSTRUCTIONS
HOSPITALIST DISCHARGE INSTRUCTIONS  NAME: Marc Bowers   :  1965   MRN:  [id number]     Date/Time:  4/10/2019 11:40 AM    ADMIT DATE: 2019     DISCHARGE DATE: 4/10/2019     DIAGNOSIS:  Nausea and Vomiting, Weight loss, Acute Alcoholic Hepatitis. Anemia, Alcohol abuse, Thrombocytopenia, Smoker, Hyponatremia, Overweight. MEDICATIONS:                As per medication reconciliation    · It is important that you take the medication exactly as they are prescribed. · Keep your medication in the bottles provided by the pharmacist and keep a list of the medication names, dosages, and times to be taken in your wallet. · Do not take other medications without consulting your doctor. Pain Management: per above medications    What to do at Home    Recommended diet:  Cardiac Diet    Recommended activity: Activity as tolerated and No driving while on analgesics    If you experience any of the following symptoms then please call your primary care physician or return to the emergency room if you cannot get hold of your doctor:  Fever, chills, nausea, vomiting, diarrhea, change in mentation, falling, bleeding, shortness of breath. Follow Up:   PCP you are to call and set up an appointment to see them in 2 week. F/U GI      Information obtained by :  I understand that if any problems occur once I am at home I am to contact my physician. I understand and acknowledge receipt of the instructions indicated above.                                                                                                                                            Physician's or R.N.'s Signature                                                                  Date/Time                                                                                                                                              Patient or Representative Signature                                                          Date/Time

## 2019-04-10 NOTE — ROUTINE PROCESS
Attempted to schedule  PCP JOHN apt with Dr. Chavo Ramos. Practice requested that patient schedule their own apts.

## 2019-04-10 NOTE — PROGRESS NOTES
Oncology End of Shift Note Bedside shift change report given to JOLYNN Tan (incoming nurse) by Oleksandr Dias (outgoing nurse) on Hallie De Leon. Report included the following information SBAR, Kardex, Intake/Output and MAR. Shift Summary: slept most of the night, MRI completed, AM labs drawn, requested ultram once and compazine twice Issues for Physician to Address:  Discharge? Patient on Cardiac Monitoring? [] Yes 
[x] No 
 
Rhythm:   
 
 
 
Shift Events During his shift assessment, I noticed that the patient has nicotine gum in his hoodie. I am concerned about the amount of nicotine he may be receiving because he also has a nicotine patch. I also noticed that he had a lot of cash folded into his sock. I informed him that we have a safe that is available, if he would like to lock up his money. The patient wants to keep the money in his sock.

## 2019-04-10 NOTE — PROGRESS NOTES
Oncology CM completed chart review. Noted DC order. CM talked Pt and he was interested in local resources for substance abuse problems. CM gave him information about [facility] and he was interested in talking with them about Inpatient and Outpatient Rehab Program.   
 
CM emailed CM Specialist an he indicated that Practice requires that Pt schedule their own appt. Family will transport Pt home in car. No further CM needs. Care Management Interventions PCP Verified by CM: Yes(Dr. Saucedo) Mode of Transport at Discharge: Other (see comment)(pt's wife can transport pt home by car) Transition of Care Consult (CM Consult): Discharge Planning Discharge Durable Medical Equipment: No 
Physical Therapy Consult: No 
Occupational Therapy Consult: No 
Speech Therapy Consult: No 
Current Support Network: Lives with Spouse, Own Home(lives with wife in a 1 story home with 4 steps to the entrance) Confirm Follow Up Transport: Family Discharge Location Discharge Placement: Home [facility] CM Ext [phone]

## 2019-04-10 NOTE — PROGRESS NOTES
Gastroenterology Progress Note 4/10/2019 Admit Date: 4/8/2019 Subjective: Follow up for:elevated LFTs, hepatitis, RUQ pain,N/V Feels better with treatment so far given to him. Tolerating PO but still requiring compazine. Tentative plans for discharge home today. MRI done overnight. Current Facility-Administered Medications Medication Dose Route Frequency  thiamine mononitrate (B-1) tablet 100 mg  100 mg Oral DAILY  sodium chloride (NS) flush 5-40 mL  5-40 mL IntraVENous Q8H  
 sodium chloride (NS) flush 5-40 mL  5-40 mL IntraVENous PRN  
 0.9% sodium chloride infusion  75 mL/hr IntraVENous CONTINUOUS  
 acetaminophen (TYLENOL) tablet 650 mg  650 mg Oral Q6H PRN  prochlorperazine (COMPAZINE) injection 5 mg  5 mg IntraVENous Q8H PRN  
 LORazepam (ATIVAN) tablet 1 mg  1 mg Oral Q6H  
 nicotine (NICODERM CQ) 21 mg/24 hr patch 1 Patch  1 Patch TransDERmal Q24H  pantoprazole (PROTONIX) 40 mg in sodium chloride 0.9% 10 mL injection  40 mg IntraVENous DAILY  LORazepam (ATIVAN) injection 1 mg  1 mg IntraVENous Q1H PRN  
 LORazepam (ATIVAN) injection 2 mg  2 mg IntraVENous O5G PRN  
 folic acid (FOLVITE) tablet 1 mg  1 mg Oral DAILY  escitalopram oxalate (LEXAPRO) tablet 20 mg  20 mg Oral QPM  
 lisinopril (PRINIVIL, ZESTRIL) tablet 10 mg  10 mg Oral DAILY  traMADol (ULTRAM) tablet 50 mg  50 mg Oral Q6H PRN Objective:  
 
Blood pressure 108/80, pulse 75, temperature 97.9 °F (36.6 °C), resp. rate 20, height 6' 1\" (1.854 m), weight 86.4 kg (190 lb 7.6 oz), SpO2 96 %. No intake/output data recorded. 04/08 1901 - 04/10 0700 In: 973.8 [P.O.:260; I.V.:713.8] Out: - Physical Examination:  
 
 
General:AAO x 3, HEENT:  No scleral icterus. MMM Chest:  CTA, . Heart: S1, S2, RRR 
GI: Soft, non distended, + bowel sounds, non tender Extremities: No  Cyanosis or edema CNS: CNs grossly normal. 
 
Data Review Recent Results (from the past 24 hour(s)) CBC WITH AUTOMATED DIFF Collection Time: 04/10/19  6:28 AM  
Result Value Ref Range WBC 3.3 (L) 4.1 - 11.1 K/uL  
 RBC 2.69 (L) 4.10 - 5.70 M/uL HGB 9.4 (L) 12.1 - 17.0 g/dL HCT 27.6 (L) 36.6 - 50.3 % .6 (H) 80.0 - 99.0 FL  
 MCH 34.9 (H) 26.0 - 34.0 PG  
 MCHC 34.1 30.0 - 36.5 g/dL  
 RDW 14.5 11.5 - 14.5 % PLATELET 95 (L) 188 - 400 K/uL MPV 11.1 8.9 - 12.9 FL  
 NRBC 0.0 0  WBC ABSOLUTE NRBC 0.00 0.00 - 0.01 K/uL NEUTROPHILS 50 32 - 75 % LYMPHOCYTES 40 12 - 49 % MONOCYTES 8 5 - 13 % EOSINOPHILS 2 0 - 7 % BASOPHILS 0 0 - 1 % IMMATURE GRANULOCYTES 0 0.0 - 0.5 % ABS. NEUTROPHILS 1.7 (L) 1.8 - 8.0 K/UL  
 ABS. LYMPHOCYTES 1.3 0.8 - 3.5 K/UL  
 ABS. MONOCYTES 0.3 0.0 - 1.0 K/UL  
 ABS. EOSINOPHILS 0.1 0.0 - 0.4 K/UL  
 ABS. BASOPHILS 0.0 0.0 - 0.1 K/UL  
 ABS. IMM. GRANS. 0.0 0.00 - 0.04 K/UL  
 DF AUTOMATED MAGNESIUM Collection Time: 04/10/19  6:28 AM  
Result Value Ref Range Magnesium 1.6 1.6 - 2.4 mg/dL METABOLIC PANEL, COMPREHENSIVE Collection Time: 04/10/19  6:28 AM  
Result Value Ref Range Sodium 137 136 - 145 mmol/L Potassium 3.8 3.5 - 5.1 mmol/L Chloride 106 97 - 108 mmol/L  
 CO2 25 21 - 32 mmol/L Anion gap 6 5 - 15 mmol/L Glucose 103 (H) 65 - 100 mg/dL BUN 2 (L) 6 - 20 MG/DL Creatinine 0.74 0.70 - 1.30 MG/DL  
 BUN/Creatinine ratio 3 (L) 12 - 20 GFR est AA >60 >60 ml/min/1.73m2 GFR est non-AA >60 >60 ml/min/1.73m2 Calcium 8.0 (L) 8.5 - 10.1 MG/DL Bilirubin, total 3.6 (H) 0.2 - 1.0 MG/DL  
 ALT (SGPT) 74 12 - 78 U/L  
 AST (SGOT) 171 (H) 15 - 37 U/L Alk. phosphatase 322 (H) 45 - 117 U/L Protein, total 5.5 (L) 6.4 - 8.2 g/dL Albumin 2.4 (L) 3.5 - 5.0 g/dL Globulin 3.1 2.0 - 4.0 g/dL A-G Ratio 0.8 (L) 1.1 - 2.2 Recent Labs 04/10/19 
9596 04/09/19 
0401 WBC 3.3* 3.3* HGB 9.4* 9.0*  
HCT 27.6* 25.6* PLT 95* 78* Recent Labs 04/10/19 
6183 04/09/19 
0401 04/08/19 
6594  133* 129*  
K 3.8 3.7 4.1  104 97 CO2 25 23 25 BUN 2* 5* 9  
CREA 0.74 0.65* 0.87 * 84 140* CA 8.0* 7.2* 8.1*  
MG 1.6  --  1.6 Recent Labs 04/10/19 
5418 04/09/19 
0401 04/08/19 
0966 SGOT 171* 189* 224* * 297* 362* TP 5.5* 5.0* 6.0* ALB 2.4* 2.2* 2.8*  
GLOB 3.1 2.8 3.2 LPSE  --   --  180 Recent Labs 04/08/19 
4992 INR 1.1 PTP 11.4* Recent Labs 04/09/19 
0846 04/08/19 
1546 TIBC 122*  --   
PSAT INDETERMINATE - SENT TO REFERENCE LAB FOR ADDITIONAL TESTING.  --   
FERR  --  2,079* No results found for: FOL, RBCF No results for input(s): PH, PCO2, PO2 in the last 72 hours. No results for input(s): CPK, CKNDX, TROIQ in the last 72 hours. No lab exists for component: CPKMB No results found for: CHOL, CHOLX, CHLST, CHOLV, HDL, LDL, LDLC, DLDLP, TGLX, TRIGL, TRIGP, CHHD, CHHDX No components found for: [place] Lab Results Component Value Date/Time Color DARK YELLOW 04/08/2019 10:14 AM  
 Appearance CLEAR 04/08/2019 10:14 AM  
 Specific gravity 1.010 04/08/2019 10:14 AM  
 pH (UA) 6.0 04/08/2019 10:14 AM  
 Protein NEGATIVE  04/08/2019 10:14 AM  
 Glucose NEGATIVE  04/08/2019 10:14 AM  
 Ketone NEGATIVE  04/08/2019 10:14 AM  
 Bilirubin NEGATIVE  03/08/2019 02:32 PM  
 Urobilinogen 0.2 04/08/2019 10:14 AM  
 Nitrites NEGATIVE  04/08/2019 10:14 AM  
 Leukocyte Esterase NEGATIVE  04/08/2019 10:14 AM  
 Epithelial cells FEW 04/08/2019 10:14 AM  
 Bacteria NEGATIVE  04/08/2019 10:14 AM  
 WBC 0-4 04/08/2019 10:14 AM  
 RBC 0-5 04/08/2019 10:14 AM  
 
  
MRI Results (most recent): 
Results from Hospital Encounter encounter on 04/08/19 MRI ABD W WO CONT Narrative PRELIMINARY REPORT 
 
MRCP demonstrates no acute findings. Preliminary report was provided by Dr. Melo Frank, the on-call radiologist, at 
9:13 PM 
 
Final report to follow.  
 
END PRELIMINARY REPORT 
 EXAM:  MRI ABD W WO CONT INDICATION:   iron deposition COMPARISON: Ultrasound 4/8/2019 and CT 4/8/2019. CONTRAST:  20 mL Gadavist. 
 
TECHNIQUE:  
MR of the abdomen was performed and the following sequences were obtained: 
Coronal HASTE, multiplanar MRCP, axial in and out-of-phase T1, axial T1 
fat-saturated, axial T2, and pre- and post contrast T1-weighted images. FINDINGS: 
The liver is enlarged and shows diffuse loss of signal on opposed phase relative 
to in phase imaging. There is no focal hepatic lesion or significant signal 
abnormality to suggest abnormal iron deposition. The gallbladder is normal with 
no filling defects. The spleen is normal.  The pancreas is normal.  The adrenal 
glands are normal.  The kidneys are normal. 
 
The stomach appears unremarkable. No large or small bowel abnormalities are 
identified. There is no intra or extrahepatic biliary ductal dilatation. No biliary 
strictures or filling defects are seen. There is no pancreas divisum. There is no lymphadenopathy or ascites. The visualized lower chest is 
unremarkable. The surrounding musculoskeletal and soft tissue structures appear 
unremarkable. Impression IMPRESSION:  Thyromegaly with hepatic steatosis and no significant signal 
abnormality to suggest iron deposition. No acute findings otherwise. ROS: -CP, SOB, Dysuria, palpitations, cough. Assessment: 
 
Active Problems: 
  Acute alcoholic hepatitis (6/6/6428) Intractable nausea and vomiting (4/8/2019) Plan/Discussion:  
 
His US,CT, HIDA and MRI were reviewed. Treat as alcoholic liver disease with treatment as suggested. If he tolerates lunch, can be discharged home from a GI standpoint with outpatient EGD in the near future and repeat labs in 1 week. I would recommend continuing antiemetic and PPI at discharge. LFTs slowly getting better.  
High ferritin is likely from hepatitis/Acute phase reactant but agree with HFE gene testing. Will follow up on this and other liver serologies as outpatient. If LFT's do not improve, may need outpatient liver bx. LEANDER Collado 
 
4/10/2019  9:30 AM 
 
Agree with OP follow up if he tolerates solids today. D/C on PPI and antiemetics. OP EGD(+/- liver biopsy, if LFTs still up in a week's time). Follow up with surgery as per their recommendations. Absolute Alcohol avoidance re-emphasized. I have personally reviewed the history and independently examined the patient. I have reviewed the chart and agree with the documentation recorded by the Mid Level Provider, including the assessment, treatment plan, and disposition. Marcelo Ordonez MD 
11 27 am

## 2019-04-10 NOTE — DISCHARGE SUMMARY
Hospitalist Discharge Summary     Patient ID:  Victor M Bhakta  [id number]  45 y.o.  1965    PCP on record: Oscar Aguilar MD    Admit date: 4/8/2019  Discharge date and time: 4/10/2019      DISCHARGE DIAGNOSIS:    Nausea and Vomiting, Weight loss, Acute Alcoholic Hepatitis. Anemia, Alcohol abuse, Thrombocytopenia, Smoker, Hyponatremia, Overweight. CONSULTATIONS:  IP CONSULT TO HOSPITALIST  IP CONSULT TO GASTROENTEROLOGY  IP CONSULT TO GENERAL SURGERY    Excerpted HPI from H&P of Cecilia Michelle MD:  Victor M Bhakta is a 47 y.o.  male with HTN, arthritis, daily alcohol drinker sent from PCP's office to ER for jaundice. Patient reports having intermittent vomiting since January. Has became worse in last month, vomiting several times all day, no association with eating or drinking. Has lost abou 15-20 lbs since January. In past week having intermittent abdominal cramping in b/l mid abdomen. Also having subjective fever and chills. Denies nightsweats. Stools alternating between hard to liquid and ranging colors from yellow to green to black. Last Friday had feculent appearing vomit and called pcp to advised him to go to ER but he chose not to go and made appointment today. He was noted to be jaundice and sent to ER. Two weeks ago he saw small amount of blood clots in vomit. Was seen in ER 3/8/19 due to feeling weak and vomiting and discharged home with zofran. Zofran did help with vomiting for while but has ran out. LFTs were elevated and abd US showed hepatic steatosis   We were asked to admit for work up and evaluation of the above problems. ______________________________________________________________________  DISCHARGE SUMMARY/HOSPITAL COURSE:  for full details see H&P, daily progress notes, labs, consult notes. Vomiting since January, worse in past month: Cannabis emesis Sd vs Chronic liver Ds.  So far better, monitor, GI help appreciated. Weight loss 15-20 lbs since January by his report  Acute alcoholic Hepatitis/Elevated LFTs and bilirubin.  Could be from alcoholic hepatitis, rule out cholecystitis, so far LFT trending down  Mild RUQ pain with new thickened gallbladder wall on US and CT today compared to [address] last month, HIDA is inconclusive, but all this  impress me a parenchymal disease more than gallbladder per se,  MRI abdomen (did not showed iron deposition), has hepatomegaly and high ferritin,  iron profile was low , ferritin may be acting as acute phase reactant due to acute liver disease, as per Surgery no need for intervention at this time  Acute anemia OBS is negative,  iron profile is low  Chronic alcohol abuse, 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 Marijuana abuse intermittently   Overweight: BMI 25.13  Surrogate decision maker:  wife  Code status: Full  Prophylaxis: SCD's  Recommended Disposition: Home w/Family    D/c Home and F/U with PCP and GI as outpatient    _______________________________________________________________________  Patient seen and examined by me on discharge day. Pertinent Findings:  Gen:    Not in distress  Chest: Clear lungs  CVS:   Regular rhythm. No edema  Abd:  Soft, not distended, not tender  Neuro:  Alert, GCS 15  _______________________________________________________________________  DISCHARGE MEDICATIONS:   Current Discharge Medication List      START taking these medications    Details   folic acid (FOLVITE) 1 mg tablet Take 1 Tab by mouth daily. Qty: 30 Tab, Refills: 1      LORazepam (ATIVAN) 1 mg tablet Take 1 Tab by mouth Multiple for Anxiety.  Take 1 tab po TID for 3 days then  Take 1 tab po BID for 3 days then  Take 1 tab po daily for 3 days then stop  Qty: 18 Tab, Refills: 0 Associated Diagnoses: Acute alcoholic hepatitis      nicotine (NICODERM CQ) 21 mg/24 hr 1 Patch by TransDERmal route every twenty-four (24) hours for 30 days. Qty: 30 Patch, Refills: 0      thiamine mononitrate (B-1) 100 mg tablet Take 1 Tab by mouth daily. Qty: 30 Tab, Refills: 1      omeprazole (PRILOSEC) 40 mg capsule Take 1 Cap by mouth daily. Qty: 30 Cap, Refills: 1      prochlorperazine (COMPAZINE) 10 mg tablet Take 1 Tab by mouth every six (6) hours as needed for Nausea for up to 7 days. Qty: 30 Tab, Refills: 0         CONTINUE these medications which have CHANGED    Details   lisinopril (PRINIVIL, ZESTRIL) 10 mg tablet Take 1 Tab by mouth daily. Qty: 30 Tab, Refills: 1      traMADol (ULTRAM) 50 mg tablet Take 1 Tab by mouth every six (6) hours as needed for Pain for up to 5 days. Max Daily Amount: 200 mg. Qty: 20 Tab, Refills: 0    Associated Diagnoses: Acute alcoholic hepatitis         CONTINUE these medications which have NOT CHANGED    Details   albuterol (PROVENTIL VENTOLIN) 2.5 mg /3 mL (0.083 %) nebulizer solution 2.5 mg by Nebulization route two (2) times daily as needed for Wheezing or Shortness of Breath. albuterol (PROVENTIL HFA, VENTOLIN HFA, PROAIR HFA) 90 mcg/actuation inhaler Take 2 Puffs by inhalation every four (4) hours as needed for Wheezing or Shortness of Breath. ascorbic acid, vitamin C, (VITAMIN C) 500 mg tablet Take 1,000-5,000 mg by mouth daily as needed (cold). mv-mn/folic [id number]/OCTXRU/GXF151 (DAVID MULTIVITAMIN FOR MEN PO) Take 1 Tab by mouth daily. escitalopram oxalate (LEXAPRO) 20 mg tablet Take 20 mg by mouth every evening.          STOP taking these medications       ALPRAZolam (XANAX) 0.5 mg tablet Comments:   Reason for Stopping:         ibuprofen (MOTRIN) 200 mg tablet Comments:   Reason for Stopping:         Pseudoephedrine-Ibuprofen (ADVIL COLD AND SINUS)  mg cap Comments:   Reason for Stopping:         ondansetron (ZOFRAN ODT) 4 mg disintegrating tablet Comments:   Reason for Stopping:         naproxen (NAPROSYN) 500 mg tablet Comments:   Reason for Stopping:         ALPRAZolam (XANAX) 0.5 mg tablet Comments:   Reason for Stopping:               My Recommended Diet, Activity, Wound Care, and follow-up labs are listed in the patient's Discharge Insturctions which I have personally completed and reviewed.     ______________________________________________________________________    Risk of deterioration: Moderate    Condition at Discharge:  Stable  ______________________________________________________________________    Disposition  Home with family, no needs  ______________________________________________________________________    Care Plan discussed with:   Patient, RN, Care Manager, Consultant    ______________________________________________________________________    Code Status: Full Code  ______________________________________________________________________      Follow up with:   PCP : Mildred Hart MD  Follow-up Information     Follow up With Specialties Details Why Contact Info    Luana Saucedo MD Pediatrics, Internal Medicine   [address] [place] [address]  [phone]        F/U PCP  F/U GI        Total time in minutes spent coordinating this discharge (includes going over instructions, follow-up, prescriptions, and preparing report for sign off to her PCP) :  35 minutes    Signed:  Ricardo Burt MD

## 2019-04-10 NOTE — PROGRESS NOTES
Problem: Falls - Risk of 
Goal: *Absence of Falls Description Document Isabela Omer Fall Risk and appropriate interventions in the flowsheet. Outcome: Resolved/Met Problem: Patient Education: Go to Patient Education Activity Goal: Patient/Family Education Outcome: Resolved/Met Problem: Pain - Acute Goal: *Control of acute pain 4/10/2019 1215 by Jerome Talbot RN Outcome: Resolved/Met 
4/10/2019 0759 by Jerome Talbot RN Outcome: Progressing Towards Goal 
  
Problem: Patient Education: Go to Patient Education Activity Goal: Patient/Family Education Outcome: Resolved/Met Problem: Alcohol Withdrawal 
Goal: *STG: Participates in treatment plan Outcome: Resolved/Met Goal: *STG: Remains safe in hospital 
Outcome: Resolved/Met Goal: *STG: Seeks staff when symptoms of withdrawal increase Outcome: Resolved/Met Goal: *STG: Complies with medication therapy Outcome: Resolved/Met Goal: *STG: Will identify negative impact of chemical dependency including the use of tobacco, alcohol, and other substances Outcome: Resolved/Met Goal: *STG: Verbalizes abstinence as an achievable goal 
Outcome: Resolved/Met Goal: *STG: Maintains appropriate nutrition and hydration Outcome: Resolved/Met Goal: *STG: Vital signs within defined limits Outcome: Resolved/Met Goal: Interventions Outcome: Resolved/Met Problem: Patient Education: Go to Patient Education Activity Goal: Patient/Family Education Outcome: Resolved/Met Problem: Hypertension Goal: *Blood pressure within specified parameters Outcome: Resolved/Met Goal: *Fluid volume balance Outcome: Resolved/Met Goal: *Labs within defined limits Outcome: Resolved/Met Problem: Patient Education: Go to Patient Education Activity Goal: Patient/Family Education Outcome: Resolved/Met

## 2019-04-12 LAB — HFE GENE MUT ANL BLD/T: NORMAL
